# Patient Record
Sex: MALE | Race: WHITE
[De-identification: names, ages, dates, MRNs, and addresses within clinical notes are randomized per-mention and may not be internally consistent; named-entity substitution may affect disease eponyms.]

---

## 2018-06-19 ENCOUNTER — HOSPITAL ENCOUNTER (OUTPATIENT)
Age: 78
End: 2018-06-19
Payer: MEDICARE

## 2018-06-19 DIAGNOSIS — I71.4: ICD-10-CM

## 2018-06-19 DIAGNOSIS — I10: ICD-10-CM

## 2018-06-19 DIAGNOSIS — Z12.5: Primary | ICD-10-CM

## 2018-06-19 LAB
ALBUMIN SERPL-MCNC: 3.8 G/DL (ref 3.5–5)
ALBUMIN/GLOB SERPL: 1.4 {RATIO} (ref 1–2.8)
ALP SERPL-CCNC: 70 U/L (ref 38–126)
ALT SERPL-CCNC: 47 IU/L (ref 21–72)
BUN SERPL-MCNC: 35 MG/DL (ref 9–20)
CALCIUM SERPL-MCNC: 9.4 MG/DL (ref 8.4–10.2)
CHLORIDE SERPL-SCNC: 107 MMOL/L (ref 98–107)
CO2 SERPL-SCNC: 28 MMOL/L (ref 22–32)
ESTIMATED GLOMERULAR FILT RATE: 48.9 ML/MIN (ref 60–?)
GLOBULIN SER CALC-MCNC: 2.8 G/DL (ref 1.7–4.1)
GLUCOSE SERPL-MCNC: 132 MG/DL (ref 80–110)
HEMOLYSIS: < 15 (ref 0–50)
POTASSIUM SERPL-SCNC: 4.1 MMOL/L (ref 3.4–5.1)
PROT SERPL-MCNC: 6.6 G/DL (ref 6.3–8.2)
SODIUM SERPL-SCNC: 144 MMOL/L (ref 137–145)

## 2018-06-19 PROCEDURE — 80053 COMPREHEN METABOLIC PANEL: CPT

## 2018-06-19 PROCEDURE — 36415 COLL VENOUS BLD VENIPUNCTURE: CPT

## 2018-11-26 ENCOUNTER — HOSPITAL ENCOUNTER (OUTPATIENT)
Age: 78
End: 2018-11-26
Payer: MEDICARE

## 2018-11-26 DIAGNOSIS — R97.20: ICD-10-CM

## 2018-11-26 DIAGNOSIS — I10: Primary | ICD-10-CM

## 2018-11-26 LAB
ALBUMIN SERPL-MCNC: 3.6 G/DL (ref 3.5–5)
ALBUMIN/GLOB SERPL: 1.6 {RATIO} (ref 1–2.8)
ALP SERPL-CCNC: 65 U/L (ref 38–126)
ALT SERPL-CCNC: 34 IU/L (ref 21–72)
BUN SERPL-MCNC: 32 MG/DL (ref 9–20)
CALCIUM SERPL-MCNC: 9 MG/DL (ref 8.4–10.2)
CHLORIDE SERPL-SCNC: 107 MMOL/L (ref 98–107)
CO2 SERPL-SCNC: 28 MMOL/L (ref 22–32)
ESTIMATED GLOMERULAR FILT RATE: 48.9 ML/MIN (ref 60–?)
GLOBULIN SER CALC-MCNC: 2.3 G/DL (ref 1.7–4.1)
GLUCOSE SERPL-MCNC: 121 MG/DL (ref 80–110)
HEMOLYSIS: < 15 (ref 0–50)
POTASSIUM SERPL-SCNC: 4.4 MMOL/L (ref 3.4–5.1)
PROT SERPL-MCNC: 5.9 G/DL (ref 6.3–8.2)
PSA SERPL-MCNC: 4.14 NG/ML (ref 0.1–4)
SODIUM SERPL-SCNC: 146 MMOL/L (ref 137–145)

## 2018-11-26 PROCEDURE — 80053 COMPREHEN METABOLIC PANEL: CPT

## 2018-11-26 PROCEDURE — 84153 ASSAY OF PSA TOTAL: CPT

## 2018-11-26 PROCEDURE — 36415 COLL VENOUS BLD VENIPUNCTURE: CPT

## 2018-12-13 ENCOUNTER — HOSPITAL ENCOUNTER (OUTPATIENT)
Age: 78
End: 2018-12-13
Payer: MEDICARE

## 2018-12-13 DIAGNOSIS — I71.4: Primary | ICD-10-CM

## 2018-12-13 PROCEDURE — 76775 US EXAM ABDO BACK WALL LIM: CPT

## 2018-12-13 NOTE — DI.US.S_ITS
PROCEDURE:  US RETRO PERITONEAL LIMITED  
   
INDICATIONS:  AAA  
   
TECHNIQUE:  Real time scanning was performed of the aorta and iliac arteries, with image   
documentation.    
   
COMPARISON:  [facility], US, RENAL OR RETROPERITONEAL LIMIT, 11/13/2017, 9:44.  
   
FINDINGS:    
Aorta:  Proximal aortic obscured by overlying bowel gas.  Mid-aorta measures 2.4 cm.    
Distal aortic diameter is 5.1 cm.    
   
Iliac arteries:  Right common iliac artery measures 1.5 cm.  Left common iliac artery   
measures 1.4cm.    
   
IMPRESSION: Increase in size of infrarenal distal abdominal aortic aneurysm measuring up   
to 5.1 cm in maximal AP diameter.  Recommend 3-6 month followup ultrasound and consider   
surgery/endovascular treatment.  
   
   
   
Dictated by: Anthony OSUNA Interpreted: Blue Dallas MD on 12/13/2018 at 8:22       
Approved by: Blue Dallas M.D. on 12/13/2018 at 11:50

## 2019-02-08 ENCOUNTER — HOSPITAL ENCOUNTER (OUTPATIENT)
Age: 79
End: 2019-02-08
Payer: MEDICARE

## 2019-02-08 DIAGNOSIS — E87.6: Primary | ICD-10-CM

## 2019-02-08 LAB
BUN SERPL-MCNC: 32 MG/DL (ref 9–20)
CALCIUM SERPL-MCNC: 8.9 MG/DL (ref 8.4–10.2)
CHLORIDE SERPL-SCNC: 103 MMOL/L (ref 98–107)
CO2 SERPL-SCNC: 29 MMOL/L (ref 22–32)
ESTIMATED GLOMERULAR FILT RATE: 48.9 ML/MIN (ref 60–?)
GLUCOSE SERPL-MCNC: 128 MG/DL (ref 80–110)
HEMOLYSIS: < 15 (ref 0–50)
POTASSIUM SERPL-SCNC: 4.5 MMOL/L (ref 3.4–5.1)
SODIUM SERPL-SCNC: 140 MMOL/L (ref 137–145)

## 2019-02-08 PROCEDURE — 36415 COLL VENOUS BLD VENIPUNCTURE: CPT

## 2019-02-08 PROCEDURE — 80048 BASIC METABOLIC PNL TOTAL CA: CPT

## 2019-02-20 ENCOUNTER — HOSPITAL ENCOUNTER (OUTPATIENT)
Age: 79
End: 2019-02-20
Payer: MEDICARE

## 2019-02-20 DIAGNOSIS — N32.3: ICD-10-CM

## 2019-02-20 DIAGNOSIS — N28.89: Primary | ICD-10-CM

## 2019-02-20 DIAGNOSIS — N40.0: ICD-10-CM

## 2019-02-20 DIAGNOSIS — N32.9: ICD-10-CM

## 2019-02-20 PROCEDURE — 76770 US EXAM ABDO BACK WALL COMP: CPT

## 2019-02-20 NOTE — DI.US.S_ITS
PROCEDURE:  US RENAL COMPLETE  
   
INDICATIONS:  RENAL MASS ON OUTSIDE CT  
   
TECHNIQUE:    
Real-time scanning was performed of the kidneys and bladder, with image documentation.    
   
COMPARISON:  Outside Facility, , CT ANGIOGRAPHY ABDOMEN AND PELVIS, 1/15/2019, 10:45.    
[facility], CT, ABDOMEN/PELVIS WITH CONTRAST, 10/03/2012, 13:35.  formerly [facility], US, RETROPERITONEAL W/DOP LTD(PNL), 10/29/2012, 10:12.  [facility], US,   
RENAL OR RETROPERITONEAL LIMIT, 11/13/2017, 9:44.  [facility], US, RENAL OR   
RETROPERITONEAL LIMIT, 5/11/2017, 9:20.  [facility], US, RENAL OR RETROPERITONEAL   
LIMIT, 11/03/2016, 8:37.  [facility], US RETRO PERITONEAL LIMITED, 12/13/2018,   
7:25.  
   
FINDINGS:    
   
Kidneys:  Kidneys are normal in size.  Right kidney measures 11.0 cm long; left kidney   
measures 11.0 cm long.  Right renal cortical thickness is 0.6 cm; left renal cortical   
thickness is 0.7 cm.  Renal cortical echotexture is normal.  No hydronephrosis or   
nephrolithiasis.  There is a 1.4 cm exophytic, simple appearing cyst in the inferior pole   
the right kidney, correlating with the mass seen on the outside CT.  A 1.5 cm simple cyst   
is noted in the mid to superior pole of the right kidney. In the left kidney, there is a   
1.5 cm cyst in the lateral aspect of the left kidney demonstrating a small focus of mural   
calcification.  
   
Bladder:  Pre-void bladder volume is 533 mL.  Post-void residual is 276 mL.  There is a   
hypoechoic mass in the gravity dependent bladder base measuring 5.8 x 2.8 x 5.3 cm. A   
bladder diverticulum is noted in the right superior aspect of the bladder. Prostate is   
enlarged.  
   
On pelvic ultrasound comparison no vascularity. On pre-void images, both ureteral jets   
are noted with color Doppler interrogation.  (Of note, ureteral jets may not be   
detectable in up to 25% of cases due to insufficient differences in specific gravity   
between ureteral and bladder urine).    
   
Miscellaneous:  No free pelvic fluid.    
   
IMPRESSION:  
   
1. The exophytic mass in the inferior pole of the right kidney is compatible with a   
simple cyst on ultrasound. Other simple appearing cysts are also noted in kidneys.  
   
2. A 5.8 x 2.8 x 5.3 cm nonvascular mass is present within the gravity dependent bladder   
lumen, possibly a clot.  
   
3. 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  
   
4. Enlarged prostate.  
   
5. 276 mL post void residual consistent with urinary retention.  
   
   
Dictated by: Blue Dallas M.D. on 2/20/2019 at 14:30       
Approved by: Blue Dallas M.D. on 2/20/2019 at 14:45

## 2019-06-03 ENCOUNTER — HOSPITAL ENCOUNTER (OUTPATIENT)
Age: 79
End: 2019-06-03
Payer: MEDICARE

## 2019-06-03 DIAGNOSIS — E78.2: Primary | ICD-10-CM

## 2019-06-03 DIAGNOSIS — Z12.5: ICD-10-CM

## 2019-06-03 DIAGNOSIS — I10: ICD-10-CM

## 2019-06-03 LAB
ALBUMIN SERPL-MCNC: 3.6 G/DL (ref 3.5–5)
ALBUMIN/GLOB SERPL: 1.6 {RATIO} (ref 1–2.8)
ALP SERPL-CCNC: 69 U/L (ref 38–126)
ALT SERPL-CCNC: 29 IU/L (ref 21–72)
BUN SERPL-MCNC: 31 MG/DL (ref 9–20)
CALCIUM SERPL-MCNC: 9.2 MG/DL (ref 8.4–10.2)
CHLORIDE SERPL-SCNC: 104 MMOL/L (ref 98–107)
CHOLEST SERPL-MCNC: 156 MG/DL (ref 140–199)
CO2 SERPL-SCNC: 31 MMOL/L (ref 22–32)
ESTIMATED GLOMERULAR FILT RATE: 45 ML/MIN (ref 60–?)
GLOBULIN SER CALC-MCNC: 2.3 G/DL (ref 1.7–4.1)
GLUCOSE SERPL-MCNC: 125 MG/DL (ref 80–110)
HDLC SERPL-MCNC: 37 MG/DL (ref 40–60)
HEMOLYSIS: < 15 (ref 0–50)
POTASSIUM SERPL-SCNC: 4.7 MMOL/L (ref 3.4–5.1)
PROT SERPL-MCNC: 5.9 G/DL (ref 6.3–8.2)
PSA SERPL-MCNC: 4.04 NG/ML (ref 0.1–4)
SODIUM SERPL-SCNC: 142 MMOL/L (ref 137–145)
TRIGL SERPL-MCNC: 112 MG/DL (ref 35–150)

## 2019-06-03 PROCEDURE — 36415 COLL VENOUS BLD VENIPUNCTURE: CPT

## 2019-06-03 PROCEDURE — 80061 LIPID PANEL: CPT

## 2019-06-03 PROCEDURE — 84153 ASSAY OF PSA TOTAL: CPT

## 2019-06-03 PROCEDURE — 80053 COMPREHEN METABOLIC PANEL: CPT

## 2019-09-03 ENCOUNTER — HOSPITAL ENCOUNTER (OUTPATIENT)
Age: 79
End: 2019-09-03
Payer: MEDICARE

## 2019-09-03 DIAGNOSIS — I10: Primary | ICD-10-CM

## 2019-09-03 LAB
BUN SERPL-MCNC: 34 MG/DL (ref 9–20)
CALCIUM SERPL-MCNC: 9.5 MG/DL (ref 8.4–10.2)
CHLORIDE SERPL-SCNC: 106 MMOL/L (ref 98–107)
CO2 SERPL-SCNC: 30 MMOL/L (ref 22–32)
ESTIMATED GLOMERULAR FILT RATE: 39 ML/MIN (ref 60–?)
GLUCOSE SERPL-MCNC: 137 MG/DL (ref 80–110)
HEMOLYSIS: < 15 (ref 0–50)
POTASSIUM SERPL-SCNC: 4.4 MMOL/L (ref 3.4–5.1)
SODIUM SERPL-SCNC: 143 MMOL/L (ref 137–145)

## 2019-09-03 PROCEDURE — 80048 BASIC METABOLIC PNL TOTAL CA: CPT

## 2019-09-03 PROCEDURE — 36415 COLL VENOUS BLD VENIPUNCTURE: CPT

## 2019-11-18 ENCOUNTER — HOSPITAL ENCOUNTER (OUTPATIENT)
Age: 79
End: 2019-11-18
Payer: MEDICARE

## 2019-11-18 DIAGNOSIS — R01.1: ICD-10-CM

## 2019-11-18 DIAGNOSIS — I08.3: Primary | ICD-10-CM

## 2019-11-18 PROCEDURE — 76770 US EXAM ABDO BACK WALL COMP: CPT

## 2019-11-18 PROCEDURE — 93307 TTE W/O DOPPLER COMPLETE: CPT

## 2019-11-18 NOTE — DI.ECHO.S_ITS
"                                    [facility]  
+---------+                        [facility]                        +---------+  
:         :                      [address]                     :         :  
:         :                      [address]                     :         :  
:         :                          [id number]                         :         :  
:         :                       Phone: [phone]                      :         :  
+---------+                        [phone]                        +---------+  
                             Echocardiogram Report  
+-----------------------------------------------------------------------------+  
:Name: DELMER ZARCO                 Study Date: 2019   Height: 72 in  :  
:[facility] MRN #: [id number]                                    Weight: 227 lb :  
:Account #: [id number]                Gender: Male             BSA: 2.2 m2    :  
:: 1940                      Age: 79 yrs              BP: 174/86 mmHg:  
:Reason For Study: MURMUR                                                     :  
:                                     Performed By: Temp Staff                :  
:Referring: CONNOR WALKER                                         :  
+-----------------------------------------------------------------------------+  
Interpretation Summary  
Mild-moderate concentric left ventricular hypertrophy with ejection fraction  
55-60%.  
Severely dilated left atrium.  
   
Mild aortic stenosis.  
Mild mitral regurgitation.  
   
Mild tricuspid regurgitation.  
Mildly dilated aortic root and ascending aorta.  
   
   
Procedure:   A two-dimensional transthoracic echocardiogram with color flow  
and Doppler was performed. The study quality was technically adequate. There  
is no prior echocardiogram noted for this patient. The patient was in normal  
sinus rhythm during the exam.  
Left Ventricle:   The left ventricle is normal in size. There is mild-moderate  
concentric left ventricular hypertrophy. The ejection fraction is estimated to  
be 55-60%. There are no focal wall motion abnormalities.  
Right Ventricle:   The right ventricle is normal in size and function.  
Atria:   The left atrium is severely dilated. Right atrial size is normal. The  
interatrial septum is intact with no evidence for an atrial septal defect.  
Mitral Valve:   The mitral valve leaflets appear mildly thickened, but open  
well. There is mild mitral annular calcification. There is mild mitral  
regurgitation.  
Aortic Valve:   The aortic valve is trileaflet. There is mild aortic stenosis.  
The calculated aortic valve area is 1.9 cm2. The peak aortic velocity is 2.29  
m/sec. The aortic valve mean gradient is 10 mmHg. There is moderate aortic  
regurgitation.  
Tricuspid Valve:   The tricuspid valve is normal in structure and function.  
There is mild tricuspid regurgitation. Pulmonary artery pressures cannot be  
estimated because of the lack of a measurable TR jet velocity.  
Pulmonic Valve:   The pulmonic valve is normal in structure and function.  
There is mild pulmonic regurgitation.  
Great Vessels:   The aortic root is mildly dilated. The ascending aorta is  
mildly enlarged. The pulmonary artery is normal size. The inferior vena cava  
was not visualized.  
Pericardium/ Pleura   There is no pericardial effusion. There is no pleural  
effusion.  
   
   
MMode/2D Measurements & Calculations  
LVIDd: 5.1 cm                                   LVOT diam: 2.2 cm  
LVIDs: 4.1 cm                                   Ao root diam: 3.7 cm  
FS: 20.6 %                                      Aortic Jxn: 2.7 cm  
EPSS: 1.5 cm                                    asc Aorta Diam: 3.9 cm  
IVSd: 1.4 cm  
LVPWd: 1.4 cm  
LV jaimes. diameter/BSA (cm/m^2): 2.3  
LV sys. diameter/BSA (cm/m^2): 1.8  
        _______________________________________________________________  
LA A2 area: 30.7 cm2                            RA long 
[id number]|2019 14:47:00|2019 14:47:00|PRAVEEN|[facility]|Health Information Management|[id number]|" Operative Date/Time/Diagnoses

## 2019-11-18 NOTE — DI.US.S_ITS
PROCEDURE:  US RENAL COMPLETE  
   
INDICATIONS:  CHRONIC KIDNEY DISEASE  
   
TECHNIQUE:    
Real-time scanning was performed of the kidneys and bladder, with image documentation.    
   
COMPARISON:  [facility], , US RETRO PERITONEAL LIMITED, 12/13/2018, 7:25.  Outside   
Facility, , CT ANGIOGRAPHY ABDOMEN AND PELVIS, 1/15/2019, 10:45.  [facility], ,   
US RENAL COMPLETE, 2/20/2019, 11:19.  
   
FINDINGS:    
   
Kidneys:  Kidneys are normal in size.  Right kidney measures 9.6 cm long; left kidney   
measures 10.7 cm long.  Right renal cortical thickness is 1.3 cm; left renal cortical   
thickness is 0.9 cm.  Renal cortical echotexture is normal.  No hydronephrosis or   
nephrolithiasis.  No suspicious solid mass lesions.  There are small renal cortical cysts   
bilaterally. There is a 1.3 cm cyst in anterior cortex of the mid right kidney, and a 1.6   
cm cyst in the anterior cortex upper pole of the right kidney. A 1.5 cm cyst is noted in   
the anterior cortex of the left kidney.  
   
Bladder:  Pre-void bladder volume is 185 mL.  Post-void residual is 65 mL.  Pre-void   
images demonstrate no intraluminal masses or stones.  On pre-void images, right ureteral   
jet is noted with color Doppler interrogation.  Left ureteral jet is not visualized. (Of   
note, ureteral jets may not be detectable in up to 25% of cases due to insufficient   
differences in specific gravity between ureteral and bladder urine).    
   
Miscellaneous:  No free pelvic fluid.    
   
IMPRESSION:  
   
1. Bilateral renal cortical thinning and small renal cortical cysts. No hydronephrosis.  
   
2. Moderate postvoid residual in bladder measuring 65 mL.  
   
   
   
   
Dictated by: Blue Dallas M.D. on 11/18/2019 at 11:31       
Approved by: Blue Dallas M.D. on 11/18/2019 at 11:44

## 2020-01-16 ENCOUNTER — HOSPITAL ENCOUNTER (OUTPATIENT)
Age: 80
End: 2020-01-16
Payer: MEDICARE

## 2020-01-16 DIAGNOSIS — N18.3: ICD-10-CM

## 2020-01-16 DIAGNOSIS — I10: Primary | ICD-10-CM

## 2020-01-16 LAB
ALBUMIN SERPL-MCNC: 3.7 G/DL (ref 3.5–5)
ALBUMIN/GLOB SERPL: 1.5 {RATIO} (ref 1–2.8)
ALP SERPL-CCNC: 70 U/L (ref 38–126)
ALT SERPL-CCNC: 22 IU/L (ref ?–50)
BUN SERPL-MCNC: 31 MG/DL (ref 9–20)
CALCIUM SERPL-MCNC: 9.8 MG/DL (ref 8.4–10.2)
CHLORIDE SERPL-SCNC: 106 MMOL/L (ref 98–107)
CO2 SERPL-SCNC: 28 MMOL/L (ref 22–32)
ESTIMATED GLOMERULAR FILT RATE: 39 ML/MIN (ref 60–?)
GLOBULIN SER CALC-MCNC: 2.5 G/DL (ref 1.7–4.1)
GLUCOSE SERPL-MCNC: 122 MG/DL (ref 80–110)
HEMOLYSIS: < 15 (ref 0–50)
POTASSIUM SERPL-SCNC: 4.1 MMOL/L (ref 3.4–5.1)
PROT SERPL-MCNC: 6.2 G/DL (ref 6.3–8.2)
SODIUM SERPL-SCNC: 141 MMOL/L (ref 137–145)

## 2020-01-16 PROCEDURE — 80053 COMPREHEN METABOLIC PANEL: CPT

## 2020-01-16 PROCEDURE — 36415 COLL VENOUS BLD VENIPUNCTURE: CPT

## 2020-01-18 ENCOUNTER — HOSPITAL ENCOUNTER (OUTPATIENT)
Age: 80
End: 2020-01-18
Payer: MEDICARE

## 2020-01-18 DIAGNOSIS — N18.3: Primary | ICD-10-CM

## 2020-01-18 LAB
APPEARANCE UR: CLEAR
BILIRUBIN URINE UA: NEGATIVE
COLOR UR: YELLOW
GLUCOSE URINE UA: NEGATIVE G/DL
HGB UR QL: NEGATIVE
KETONES URINE UA: NEGATIVE
LEUKOCYTE ESTERASE URINE UA: NEGATIVE
MICROALBUMI CREATININ RATIO UR: 624 UG/MG CR (ref ?–30)
NITRITE URINE UA: NEGATIVE
PH UR: 6.5 [PH] (ref 4.5–8)
PROTEIN URINE UA: (no result)
SP GR UR: 1.01 (ref 1–1.03)
URINE COMMENTS: (no result)
UROBILINOGEN UR QL: 0.2 E.U./DL

## 2020-01-18 PROCEDURE — 81001 URINALYSIS AUTO W/SCOPE: CPT

## 2020-01-18 PROCEDURE — 82043 UR ALBUMIN QUANTITATIVE: CPT

## 2020-01-18 PROCEDURE — 82570 ASSAY OF URINE CREATININE: CPT

## 2020-04-09 ENCOUNTER — HOSPITAL ENCOUNTER (OUTPATIENT)
Age: 80
End: 2020-04-09
Payer: MEDICARE

## 2020-04-09 DIAGNOSIS — N18.3: ICD-10-CM

## 2020-04-09 DIAGNOSIS — I10: Primary | ICD-10-CM

## 2020-04-09 LAB
ALBUMIN SERPL-MCNC: 3.8 G/DL (ref 3.5–5)
ALBUMIN/GLOB SERPL: 1.4 {RATIO} (ref 1–2.8)
ALP SERPL-CCNC: 69 U/L (ref 38–126)
ALT SERPL-CCNC: 24 IU/L (ref ?–50)
BUN SERPL-MCNC: 40 MG/DL (ref 9–20)
CALCIUM SERPL-MCNC: 9.2 MG/DL (ref 8.4–10.2)
CHLORIDE SERPL-SCNC: 107 MMOL/L (ref 98–107)
CO2 SERPL-SCNC: 28 MMOL/L (ref 22–32)
ESTIMATED GLOMERULAR FILT RATE: 35 ML/MIN (ref 60–?)
GLOBULIN SER CALC-MCNC: 2.8 G/DL (ref 1.7–4.1)
GLUCOSE SERPL-MCNC: 122 MG/DL (ref 80–110)
HEMOLYSIS: < 15 (ref 0–50)
POTASSIUM SERPL-SCNC: 4.1 MMOL/L (ref 3.4–5.1)
PROT SERPL-MCNC: 6.6 G/DL (ref 6.3–8.2)
SODIUM SERPL-SCNC: 142 MMOL/L (ref 137–145)
URATE SERPL-MCNC: 7.7 MG/DL (ref 3.5–8.5)

## 2020-04-09 PROCEDURE — 36415 COLL VENOUS BLD VENIPUNCTURE: CPT

## 2020-04-09 PROCEDURE — 80053 COMPREHEN METABOLIC PANEL: CPT

## 2020-04-09 PROCEDURE — 84550 ASSAY OF BLOOD/URIC ACID: CPT

## 2020-05-18 ENCOUNTER — HOSPITAL ENCOUNTER (OUTPATIENT)
Age: 80
End: 2020-05-18
Payer: MEDICARE

## 2020-05-18 DIAGNOSIS — N18.3: ICD-10-CM

## 2020-05-18 DIAGNOSIS — I10: Primary | ICD-10-CM

## 2020-05-18 LAB
BUN SERPL-MCNC: 40 MG/DL (ref 9–20)
CALCIUM SERPL-MCNC: 9.4 MG/DL (ref 8.4–10.2)
CHLORIDE SERPL-SCNC: 107 MMOL/L (ref 98–107)
CO2 SERPL-SCNC: 29 MMOL/L (ref 22–32)
ESTIMATED GLOMERULAR FILT RATE: 37.6 ML/MIN (ref 60–?)
GLUCOSE SERPL-MCNC: 121 MG/DL (ref 80–110)
HEMOLYSIS: < 15 (ref 0–50)
POTASSIUM SERPL-SCNC: 4.7 MMOL/L (ref 3.4–5.1)
SODIUM SERPL-SCNC: 141 MMOL/L (ref 137–145)

## 2020-05-18 PROCEDURE — 80048 BASIC METABOLIC PNL TOTAL CA: CPT

## 2020-05-18 PROCEDURE — 36415 COLL VENOUS BLD VENIPUNCTURE: CPT

## 2020-06-30 ENCOUNTER — HOSPITAL ENCOUNTER (OUTPATIENT)
Age: 80
End: 2020-06-30
Payer: MEDICARE

## 2020-06-30 DIAGNOSIS — N18.3: Primary | ICD-10-CM

## 2020-06-30 LAB
ADD MANUAL DIFF / SLIDE REVIEW: NO
BUN SERPL-MCNC: 39 MG/DL (ref 9–20)
CALCIUM SERPL-MCNC: 9.4 MG/DL (ref 8.4–10.2)
CHLORIDE SERPL-SCNC: 105 MMOL/L (ref 98–107)
CO2 SERPL-SCNC: 28 MMOL/L (ref 22–32)
ESTIMATED GLOMERULAR FILT RATE: 33 ML/MIN (ref 60–?)
GLUCOSE SERPL-MCNC: 122 MG/DL (ref 80–110)
HEMATOCRIT: 33.6 % (ref 41–53)
HEMOGLOBIN: 11.7 G/DL (ref 13.5–17.5)
HEMOLYSIS: < 15 (ref 0–50)
LYMPHOCYTES # SPEC AUTO: 1100 /UL (ref 1100–4500)
MCV RBC: 94.9 FL (ref 80–100)
MEAN CORPUSCULAR HEMOGLOBIN: 33 PG (ref 26–34)
MEAN CORPUSCULAR HGB CONC: 34.8 % (ref 30–36)
MICROALBUMI CREATININ RATIO UR: 628.2 UG/MG CR (ref ?–30)
PLATELET COUNT: 105 X10^3/UL (ref 150–400)
POTASSIUM SERPL-SCNC: 4.3 MMOL/L (ref 3.4–5.1)
SODIUM SERPL-SCNC: 139 MMOL/L (ref 137–145)

## 2020-06-30 PROCEDURE — 82570 ASSAY OF URINE CREATININE: CPT

## 2020-06-30 PROCEDURE — 85025 COMPLETE CBC W/AUTO DIFF WBC: CPT

## 2020-06-30 PROCEDURE — 36415 COLL VENOUS BLD VENIPUNCTURE: CPT

## 2020-06-30 PROCEDURE — 80048 BASIC METABOLIC PNL TOTAL CA: CPT

## 2020-06-30 PROCEDURE — 82043 UR ALBUMIN QUANTITATIVE: CPT

## 2020-08-07 ENCOUNTER — HOSPITAL ENCOUNTER (OUTPATIENT)
Age: 80
End: 2020-08-07
Payer: MEDICARE

## 2020-08-07 DIAGNOSIS — N18.3: Primary | ICD-10-CM

## 2020-08-07 DIAGNOSIS — E78.5: ICD-10-CM

## 2020-08-07 LAB
APPEARANCE UR: CLEAR
BILIRUBIN URINE UA: NEGATIVE
BUN SERPL-MCNC: 32 MG/DL (ref 9–20)
CALCIUM SERPL-MCNC: 9.3 MG/DL (ref 8.4–10.2)
CHLORIDE SERPL-SCNC: 105 MMOL/L (ref 98–107)
CHOLEST SERPL-MCNC: 153 MG/DL (ref 140–199)
CK SERPL-CCNC: 118 U/L (ref 55–170)
CKMB % RELATIVE INDEX: 2.5 % (ref 1.5–5)
CO2 SERPL-SCNC: 26 MMOL/L (ref 22–32)
COLOR UR: YELLOW
ESTIMATED GLOMERULAR FILT RATE: 34.8 ML/MIN (ref 60–?)
GLUCOSE SERPL-MCNC: 113 MG/DL (ref 80–110)
GLUCOSE URINE UA: NEGATIVE G/DL
HDLC SERPL-MCNC: 41 MG/DL (ref 40–60)
HEMOLYSIS: < 15 (ref 0–50)
HGB UR QL: NEGATIVE
KETONES URINE UA: NEGATIVE
LEUKOCYTE ESTERASE URINE UA: NEGATIVE
NITRITE URINE UA: NEGATIVE
PH UR: 6.5 [PH] (ref 4.5–8)
POTASSIUM SERPL-SCNC: 4 MMOL/L (ref 3.4–5.1)
PROTEIN URINE UA: (no result)
SODIUM SERPL-SCNC: 138 MMOL/L (ref 137–145)
SP GR UR: 1.01 (ref 1–1.03)
TRIGL SERPL-MCNC: 117 MG/DL (ref 35–150)
UROBILINOGEN UR QL: 0.2 E.U./DL

## 2020-08-07 PROCEDURE — 81001 URINALYSIS AUTO W/SCOPE: CPT

## 2020-08-07 PROCEDURE — 82550 ASSAY OF CK (CPK): CPT

## 2020-08-07 PROCEDURE — 80048 BASIC METABOLIC PNL TOTAL CA: CPT

## 2020-08-07 PROCEDURE — 82553 CREATINE MB FRACTION: CPT

## 2020-08-07 PROCEDURE — 80061 LIPID PANEL: CPT

## 2020-08-07 PROCEDURE — 36415 COLL VENOUS BLD VENIPUNCTURE: CPT

## 2020-10-27 ENCOUNTER — HOSPITAL ENCOUNTER (OUTPATIENT)
Age: 80
End: 2020-10-27
Payer: MEDICARE

## 2020-10-27 DIAGNOSIS — I10: ICD-10-CM

## 2020-10-27 DIAGNOSIS — N18.30: Primary | ICD-10-CM

## 2020-10-27 LAB
25(OH)D3+25(OH)D2 SERPL-MCNC: 46 NG/ML (ref 30–100)
ADD MANUAL DIFF / SLIDE REVIEW: NO
ALBUMIN SERPL-MCNC: 3.7 G/DL (ref 3.5–5)
ALBUMIN/GLOB SERPL: 1.4 {RATIO} (ref 1–2.8)
ALP SERPL-CCNC: 66 U/L (ref 38–126)
ALT SERPL-CCNC: 20 IU/L (ref ?–50)
APPEARANCE UR: CLEAR
BILIRUBIN URINE UA: NEGATIVE
BUN SERPL-MCNC: 43 MG/DL (ref 9–20)
CALCIUM SERPL-MCNC: 9 MG/DL (ref 8.4–10.2)
CHLORIDE SERPL-SCNC: 105 MMOL/L (ref 98–107)
CO2 SERPL-SCNC: 30 MMOL/L (ref 22–32)
COLOR UR: YELLOW
ESTIMATED GLOMERULAR FILT RATE: 30.8 ML/MIN (ref 60–?)
GLOBULIN SER CALC-MCNC: 2.6 G/DL (ref 1.7–4.1)
GLUCOSE SERPL-MCNC: 112 MG/DL (ref 80–110)
GLUCOSE URINE UA: NEGATIVE G/DL
HEMATOCRIT: 30.8 % (ref 41–53)
HEMOGLOBIN: 10.4 G/DL (ref 13.5–17.5)
HEMOLYSIS: < 15 (ref 0–50)
HGB UR QL: NEGATIVE
KETONES URINE UA: NEGATIVE
LEUKOCYTE ESTERASE URINE UA: NEGATIVE
LYMPHOCYTES # SPEC AUTO: 800 /UL (ref 1100–4500)
MCV RBC: 96 FL (ref 80–100)
MEAN CORPUSCULAR HEMOGLOBIN: 32.4 PG (ref 26–34)
MEAN CORPUSCULAR HGB CONC: 33.7 % (ref 30–36)
MICROALBUMI CREATININ RATIO UR: 815.2 UG/MG CR (ref ?–30)
NITRITE URINE UA: NEGATIVE
PH UR: 6 [PH] (ref 4.5–8)
PHOSPHATE SERPL-MCNC: 4.4 MG/DL (ref 2.3–3.7)
PLATELET COUNT: 105 X10^3/UL (ref 150–400)
POTASSIUM SERPL-SCNC: 4.2 MMOL/L (ref 3.4–5.1)
PROT SERPL-MCNC: 6.3 G/DL (ref 6.3–8.2)
PROTEIN URINE UA: (no result)
SODIUM SERPL-SCNC: 141 MMOL/L (ref 137–145)
SP GR UR: 1.01 (ref 1–1.03)
UROBILINOGEN UR QL: 0.2 E.U./DL

## 2020-10-27 PROCEDURE — 82306 VITAMIN D 25 HYDROXY: CPT

## 2020-10-27 PROCEDURE — 84100 ASSAY OF PHOSPHORUS: CPT

## 2020-10-27 PROCEDURE — 80053 COMPREHEN METABOLIC PANEL: CPT

## 2020-10-27 PROCEDURE — 81001 URINALYSIS AUTO W/SCOPE: CPT

## 2020-10-27 PROCEDURE — 83970 ASSAY OF PARATHORMONE: CPT

## 2020-10-27 PROCEDURE — 85025 COMPLETE CBC W/AUTO DIFF WBC: CPT

## 2020-10-27 PROCEDURE — 82043 UR ALBUMIN QUANTITATIVE: CPT

## 2020-10-27 PROCEDURE — 82570 ASSAY OF URINE CREATININE: CPT

## 2020-10-27 PROCEDURE — 36415 COLL VENOUS BLD VENIPUNCTURE: CPT

## 2020-11-06 ENCOUNTER — HOSPITAL ENCOUNTER (OUTPATIENT)
Age: 80
End: 2020-11-06
Payer: MEDICARE

## 2020-11-06 DIAGNOSIS — N40.0: ICD-10-CM

## 2020-11-06 DIAGNOSIS — N28.1: ICD-10-CM

## 2020-11-06 DIAGNOSIS — N32.3: ICD-10-CM

## 2020-11-06 DIAGNOSIS — N18.32: Primary | ICD-10-CM

## 2020-11-06 PROCEDURE — 76770 US EXAM ABDO BACK WALL COMP: CPT

## 2020-11-06 NOTE — DI.US.S_ITS
PROCEDURE:  US RENAL COMPLETE  
   
INDICATIONS:  Chronic kidney disease, stage 3b  
   
TECHNIQUE:    
Real-time scanning was performed of the kidneys and bladder, with image documentation.    
   
COMPARISON:  Outside Facility, , CT ANGIOGRAPHY ABDOMEN AND PELVIS, 1/15/2019, 10:45.    
[facility], , US RENAL COMPLETE, 11/18/2019, 8:19.  
   
FINDINGS:    
   
Kidneys:  Kidneys are normal in size.  Right kidney measures 13.6 cm long; left kidney   
measures 11.1 cm long.  Right renal cortical thickness is 1.3 cm; left renal cortical   
thickness is 1.0 cm.  Renal cortical echotexture is normal.  No hydronephrosis or   
nephrolithiasis.  No suspicious solid mass lesions.  There are multiple right renal cysts   
measuring up to 1.5 cm in the upper pole with internal low level echoes, and 2.2 cm in   
the lower pole with anechoic appearance.  There also multiple left renal cysts measuring   
up to 1.4 cm in the interpolar region with low level internal echoes.  Additional 1.3 cm   
cyst with low level internal echoes is seen in the inferior pole  
   
Bladder:  Pre-void bladder volume is 508 mL.  Post-void residual is 95 mL.  Pre-void   
images demonstrate no intraluminal masses or stones.  Superior bladder diverticulum   
measuring 4.4 x 2.3 x 2.4 cm.  On pre-void images, both of the ureteral jets are noted   
with color Doppler interrogation.  (Of note, ureteral jets may not be detectable in up to   
25% of cases due to insufficient differences in specific gravity between ureteral and   
bladder urine).  Prostate is enlarged measuring 6.0 x 5.3 x 4.0 cm  
   
Miscellaneous:  No free pelvic fluid.    
   
IMPRESSION:    
   
No hydronephrosis  
   
Multiple bilateral renal cysts, some of which demonstrate mildly complicated appearance,   
although unchanged.    
   
95 mL postvoid residual  
   
Enlarged prostate  
   
Superior bladder diverticulum.  
   
   
   
   
   
Dictated by: Jimmy Caro M.D. on 11/06/2020 at 11:48       
Approved by: Jimmy Caro M.D. on 11/06/2020 at 12:06

## 2020-12-11 ENCOUNTER — HOSPITAL ENCOUNTER (OUTPATIENT)
Age: 80
End: 2020-12-11
Payer: MEDICARE

## 2020-12-11 DIAGNOSIS — N18.32: ICD-10-CM

## 2020-12-11 DIAGNOSIS — N40.0: Primary | ICD-10-CM

## 2020-12-11 LAB
ALBUMIN SERPL-MCNC: 3.5 G/DL (ref 3.5–5)
BUN SERPL-MCNC: 43 MG/DL (ref 9–20)
CALCIUM SERPL-MCNC: 9.1 MG/DL (ref 8.4–10.2)
CHLORIDE SERPL-SCNC: 103 MMOL/L (ref 98–107)
CO2 SERPL-SCNC: 32 MMOL/L (ref 22–32)
ESTIMATED GLOMERULAR FILT RATE: 29.6 ML/MIN (ref 60–?)
GLUCOSE SERPL-MCNC: 121 MG/DL (ref 80–110)
HEMOLYSIS: < 15 (ref 0–50)
PHOSPHATE SERPL-MCNC: 4.8 MG/DL (ref 2.3–3.7)
POTASSIUM SERPL-SCNC: 4.6 MMOL/L (ref 3.4–5.1)
SODIUM SERPL-SCNC: 136 MMOL/L (ref 137–145)

## 2020-12-11 PROCEDURE — 84153 ASSAY OF PSA TOTAL: CPT

## 2020-12-11 PROCEDURE — 84154 ASSAY OF PSA FREE: CPT

## 2020-12-11 PROCEDURE — 80069 RENAL FUNCTION PANEL: CPT

## 2020-12-11 PROCEDURE — 36415 COLL VENOUS BLD VENIPUNCTURE: CPT

## 2020-12-12 LAB — PSA SERPL-MCNC: 4.6 NG/ML (ref 0–4)

## 2021-02-12 ENCOUNTER — HOSPITAL ENCOUNTER (OUTPATIENT)
Age: 81
End: 2021-02-12
Payer: MEDICARE

## 2021-02-12 DIAGNOSIS — N18.32: Primary | ICD-10-CM

## 2021-02-12 LAB
BUN SERPL-MCNC: 36 MG/DL (ref 9–20)
CALCIUM SERPL-MCNC: 8.7 MG/DL (ref 8.4–10.2)
CHLORIDE SERPL-SCNC: 108 MMOL/L (ref 98–107)
CO2 SERPL-SCNC: 31 MMOL/L (ref 22–32)
ESTIMATED GLOMERULAR FILT RATE: 31.5 ML/MIN (ref 60–?)
GLUCOSE SERPL-MCNC: 116 MG/DL (ref 80–110)
HEMOLYSIS: < 15 (ref 0–50)
POTASSIUM SERPL-SCNC: 4.6 MMOL/L (ref 3.4–5.1)
SODIUM SERPL-SCNC: 139 MMOL/L (ref 137–145)

## 2021-02-12 PROCEDURE — 36415 COLL VENOUS BLD VENIPUNCTURE: CPT

## 2021-02-12 PROCEDURE — 80048 BASIC METABOLIC PNL TOTAL CA: CPT

## 2021-03-10 ENCOUNTER — HOSPITAL ENCOUNTER (OUTPATIENT)
Age: 81
End: 2021-03-10
Payer: MEDICARE

## 2021-03-10 VITALS
DIASTOLIC BLOOD PRESSURE: 72 MMHG | RESPIRATION RATE: 18 BRPM | SYSTOLIC BLOOD PRESSURE: 151 MMHG | TEMPERATURE: 97.7 F | HEART RATE: 58 BPM

## 2021-03-10 DIAGNOSIS — D46.9: Primary | ICD-10-CM

## 2021-03-10 DIAGNOSIS — D63.1: ICD-10-CM

## 2021-03-10 DIAGNOSIS — D61.818: ICD-10-CM

## 2021-03-10 DIAGNOSIS — I12.9: ICD-10-CM

## 2021-03-10 DIAGNOSIS — N18.9: ICD-10-CM

## 2021-03-10 PROCEDURE — 96372 THER/PROPH/DIAG INJ SC/IM: CPT

## 2021-03-24 ENCOUNTER — HOSPITAL ENCOUNTER (OUTPATIENT)
Age: 81
End: 2021-03-24
Payer: MEDICARE

## 2021-03-24 VITALS — RESPIRATION RATE: 18 BRPM | SYSTOLIC BLOOD PRESSURE: 137 MMHG | HEART RATE: 65 BPM | DIASTOLIC BLOOD PRESSURE: 71 MMHG

## 2021-03-24 DIAGNOSIS — N18.9: ICD-10-CM

## 2021-03-24 DIAGNOSIS — D63.1: ICD-10-CM

## 2021-03-24 DIAGNOSIS — D46.9: ICD-10-CM

## 2021-03-24 DIAGNOSIS — I12.9: Primary | ICD-10-CM

## 2021-03-24 PROCEDURE — 96372 THER/PROPH/DIAG INJ SC/IM: CPT

## 2021-04-07 ENCOUNTER — HOSPITAL ENCOUNTER (OUTPATIENT)
Age: 81
End: 2021-04-07
Payer: MEDICARE

## 2021-04-07 VITALS — HEART RATE: 59 BPM | RESPIRATION RATE: 16 BRPM | TEMPERATURE: 97.88 F | OXYGEN SATURATION: 98 %

## 2021-04-07 VITALS — SYSTOLIC BLOOD PRESSURE: 151 MMHG | DIASTOLIC BLOOD PRESSURE: 79 MMHG

## 2021-04-07 DIAGNOSIS — N18.9: ICD-10-CM

## 2021-04-07 DIAGNOSIS — I12.9: Primary | ICD-10-CM

## 2021-04-07 DIAGNOSIS — D46.9: ICD-10-CM

## 2021-04-07 DIAGNOSIS — D63.1: ICD-10-CM

## 2021-04-07 PROCEDURE — 96372 THER/PROPH/DIAG INJ SC/IM: CPT

## 2021-04-21 ENCOUNTER — HOSPITAL ENCOUNTER (OUTPATIENT)
Age: 81
End: 2021-04-21
Payer: MEDICARE

## 2021-04-21 VITALS
DIASTOLIC BLOOD PRESSURE: 74 MMHG | HEART RATE: 62 BPM | OXYGEN SATURATION: 98 % | RESPIRATION RATE: 18 BRPM | SYSTOLIC BLOOD PRESSURE: 151 MMHG | TEMPERATURE: 98.78 F

## 2021-04-21 VITALS — HEART RATE: 60 BPM | SYSTOLIC BLOOD PRESSURE: 139 MMHG | DIASTOLIC BLOOD PRESSURE: 72 MMHG

## 2021-04-21 DIAGNOSIS — I12.9: ICD-10-CM

## 2021-04-21 DIAGNOSIS — D63.1: ICD-10-CM

## 2021-04-21 DIAGNOSIS — N18.9: ICD-10-CM

## 2021-04-21 DIAGNOSIS — D46.9: Primary | ICD-10-CM

## 2021-04-28 ENCOUNTER — HOSPITAL ENCOUNTER (OUTPATIENT)
Age: 81
End: 2021-04-28
Payer: MEDICARE

## 2021-04-28 DIAGNOSIS — I12.9: ICD-10-CM

## 2021-04-28 DIAGNOSIS — N18.9: ICD-10-CM

## 2021-04-28 DIAGNOSIS — D63.1: ICD-10-CM

## 2021-04-28 DIAGNOSIS — D46.9: Primary | ICD-10-CM

## 2021-04-28 PROCEDURE — 99215 OFFICE O/P EST HI 40 MIN: CPT

## 2021-04-28 PROCEDURE — 36415 COLL VENOUS BLD VENIPUNCTURE: CPT

## 2021-04-28 PROCEDURE — 96372 THER/PROPH/DIAG INJ SC/IM: CPT

## 2021-04-28 PROCEDURE — 85018 HEMOGLOBIN: CPT

## 2021-04-28 PROCEDURE — 85014 HEMATOCRIT: CPT

## 2021-05-12 ENCOUNTER — HOSPITAL ENCOUNTER (OUTPATIENT)
Age: 81
End: 2021-05-12
Payer: MEDICARE

## 2021-05-12 VITALS
OXYGEN SATURATION: 100 % | HEART RATE: 59 BPM | DIASTOLIC BLOOD PRESSURE: 85 MMHG | RESPIRATION RATE: 15 BRPM | SYSTOLIC BLOOD PRESSURE: 163 MMHG

## 2021-05-12 VITALS — HEART RATE: 60 BPM | OXYGEN SATURATION: 99 % | TEMPERATURE: 97.6 F | RESPIRATION RATE: 17 BRPM

## 2021-05-12 VITALS — SYSTOLIC BLOOD PRESSURE: 161 MMHG | DIASTOLIC BLOOD PRESSURE: 92 MMHG

## 2021-05-12 DIAGNOSIS — D61.818: ICD-10-CM

## 2021-05-12 DIAGNOSIS — I12.9: ICD-10-CM

## 2021-05-12 DIAGNOSIS — D46.9: Primary | ICD-10-CM

## 2021-05-12 DIAGNOSIS — N18.9: ICD-10-CM

## 2021-05-12 DIAGNOSIS — D63.1: ICD-10-CM

## 2021-05-12 PROCEDURE — 96372 THER/PROPH/DIAG INJ SC/IM: CPT

## 2021-05-26 ENCOUNTER — HOSPITAL ENCOUNTER (OUTPATIENT)
Age: 81
End: 2021-05-26
Payer: MEDICARE

## 2021-05-26 VITALS
DIASTOLIC BLOOD PRESSURE: 72 MMHG | SYSTOLIC BLOOD PRESSURE: 137 MMHG | HEART RATE: 64 BPM | RESPIRATION RATE: 16 BRPM | OXYGEN SATURATION: 99 % | TEMPERATURE: 97.8 F

## 2021-05-26 DIAGNOSIS — D63.1: ICD-10-CM

## 2021-05-26 DIAGNOSIS — D61.818: ICD-10-CM

## 2021-05-26 DIAGNOSIS — I12.9: ICD-10-CM

## 2021-05-26 DIAGNOSIS — N18.9: ICD-10-CM

## 2021-05-26 DIAGNOSIS — D46.9: Primary | ICD-10-CM

## 2021-05-26 PROCEDURE — 96372 THER/PROPH/DIAG INJ SC/IM: CPT

## 2021-06-09 ENCOUNTER — HOSPITAL ENCOUNTER (OUTPATIENT)
Age: 81
End: 2021-06-09
Payer: MEDICARE

## 2021-06-09 VITALS
OXYGEN SATURATION: 99 % | RESPIRATION RATE: 15 BRPM | SYSTOLIC BLOOD PRESSURE: 154 MMHG | HEART RATE: 65 BPM | DIASTOLIC BLOOD PRESSURE: 75 MMHG | TEMPERATURE: 98.2 F

## 2021-06-09 DIAGNOSIS — D46.9: ICD-10-CM

## 2021-06-09 DIAGNOSIS — N18.9: ICD-10-CM

## 2021-06-09 DIAGNOSIS — D63.1: ICD-10-CM

## 2021-06-09 DIAGNOSIS — I12.9: Primary | ICD-10-CM

## 2021-06-09 PROCEDURE — 96372 THER/PROPH/DIAG INJ SC/IM: CPT

## 2021-06-23 ENCOUNTER — HOSPITAL ENCOUNTER (OUTPATIENT)
Age: 81
End: 2021-06-23
Payer: MEDICARE

## 2021-06-23 VITALS
RESPIRATION RATE: 16 BRPM | HEART RATE: 64 BPM | DIASTOLIC BLOOD PRESSURE: 76 MMHG | SYSTOLIC BLOOD PRESSURE: 144 MMHG | TEMPERATURE: 97.1 F | OXYGEN SATURATION: 97 %

## 2021-06-23 DIAGNOSIS — N18.9: ICD-10-CM

## 2021-06-23 DIAGNOSIS — Z85.828: ICD-10-CM

## 2021-06-23 DIAGNOSIS — E78.5: ICD-10-CM

## 2021-06-23 DIAGNOSIS — I12.9: ICD-10-CM

## 2021-06-23 DIAGNOSIS — Z87.891: ICD-10-CM

## 2021-06-23 DIAGNOSIS — D46.9: Primary | ICD-10-CM

## 2021-06-23 DIAGNOSIS — D63.1: ICD-10-CM

## 2021-06-23 DIAGNOSIS — I71.4: ICD-10-CM

## 2021-06-23 DIAGNOSIS — Z85.820: ICD-10-CM

## 2021-06-23 PROCEDURE — 99215 OFFICE O/P EST HI 40 MIN: CPT

## 2021-06-23 PROCEDURE — 96372 THER/PROPH/DIAG INJ SC/IM: CPT

## 2021-06-29 ENCOUNTER — HOSPITAL ENCOUNTER (OUTPATIENT)
Age: 81
End: 2021-06-29
Payer: MEDICARE

## 2021-06-29 DIAGNOSIS — N18.32: Primary | ICD-10-CM

## 2021-06-29 LAB
25(OH)D3+25(OH)D2 SERPL-MCNC: 41 NG/ML (ref 30–100)
ADD MANUAL DIFF / SLIDE REVIEW: YES
APPEARANCE UR: CLEAR
BAND NEUTROPHILS PERCENT: 1 % (ref 3–7)
BASOPHILS NFR SPEC MANUAL: 2 % (ref 0–1)
BILIRUBIN URINE UA: NEGATIVE
BUN SERPL-MCNC: 36 MG/DL (ref 9–20)
CALCIUM SERPL-MCNC: 9.1 MG/DL (ref 8.4–10.2)
CHLORIDE SERPL-SCNC: 107 MMOL/L (ref 98–107)
CO2 SERPL-SCNC: 24 MMOL/L (ref 22–32)
COLOR UR: YELLOW
EOSINOPHILS PERCENT MANUAL: 3 % (ref 2–4)
ESTIMATED GLOMERULAR FILT RATE: 25.4 ML/MIN (ref 60–?)
GLUCOSE SERPL-MCNC: 100 MG/DL (ref 80–110)
GLUCOSE URINE UA: NEGATIVE G/DL
HEMATOCRIT: 29.9 % (ref 41–53)
HEMOGLOBIN: 10 G/DL (ref 13.5–17.5)
HEMOLYSIS: < 15 (ref 0–50)
HGB UR QL: (no result)
KETONES URINE UA: NEGATIVE
LEUKOCYTE ESTERASE URINE UA: NEGATIVE
LYMPHOCYTES PERCENT MANUAL: 18 % (ref 25–45)
MCV RBC: 93.8 FL (ref 80–100)
MEAN CORPUSCULAR HEMOGLOBIN: 31.2 PG (ref 26–34)
MEAN CORPUSCULAR HGB CONC: 33.3 % (ref 30–36)
MICROALBUMI CREATININ RATIO UR: 2045.9 UG/MG CR (ref ?–30)
MONOCYTES PERCENT MANUAL: 10 % (ref 2–11)
NEUTROPHILS ABSOLUTE MANUAL: 1876 /UL (ref 3000–5900)
NEUTS SEG NFR BLD: 66 % (ref 38–70)
NITRITE URINE UA: NEGATIVE
PH UR: 6.5 [PH] (ref 4.5–8)
PLATELET COUNT: 107 X10^3/UL (ref 150–400)
POTASSIUM SERPL-SCNC: 4.4 MMOL/L (ref 3.4–5.1)
PROTEIN URINE UA: (no result)
SODIUM SERPL-SCNC: 137 MMOL/L (ref 137–145)
SP GR UR: 1.01 (ref 1–1.03)
TOTAL CELLS COUNTED: 100
UROBILINOGEN UR QL: 0.2 E.U./DL

## 2021-06-29 PROCEDURE — 85007 BL SMEAR W/DIFF WBC COUNT: CPT

## 2021-06-29 PROCEDURE — 82043 UR ALBUMIN QUANTITATIVE: CPT

## 2021-06-29 PROCEDURE — 83970 ASSAY OF PARATHORMONE: CPT

## 2021-06-29 PROCEDURE — 85025 COMPLETE CBC W/AUTO DIFF WBC: CPT

## 2021-06-29 PROCEDURE — 82306 VITAMIN D 25 HYDROXY: CPT

## 2021-06-29 PROCEDURE — 82570 ASSAY OF URINE CREATININE: CPT

## 2021-06-29 PROCEDURE — 81001 URINALYSIS AUTO W/SCOPE: CPT

## 2021-06-29 PROCEDURE — 36415 COLL VENOUS BLD VENIPUNCTURE: CPT

## 2021-06-29 PROCEDURE — 80048 BASIC METABOLIC PNL TOTAL CA: CPT

## 2021-07-07 ENCOUNTER — HOSPITAL ENCOUNTER (OUTPATIENT)
Age: 81
End: 2021-07-07
Payer: MEDICARE

## 2021-07-07 VITALS
DIASTOLIC BLOOD PRESSURE: 74 MMHG | RESPIRATION RATE: 18 BRPM | OXYGEN SATURATION: 99 % | HEART RATE: 58 BPM | TEMPERATURE: 97.88 F | SYSTOLIC BLOOD PRESSURE: 148 MMHG

## 2021-07-07 DIAGNOSIS — D63.1: ICD-10-CM

## 2021-07-07 DIAGNOSIS — I12.9: Primary | ICD-10-CM

## 2021-07-07 DIAGNOSIS — D46.9: ICD-10-CM

## 2021-07-07 DIAGNOSIS — N18.9: ICD-10-CM

## 2021-07-07 PROCEDURE — 96372 THER/PROPH/DIAG INJ SC/IM: CPT

## 2021-07-16 ENCOUNTER — HOSPITAL ENCOUNTER (OUTPATIENT)
Age: 81
End: 2021-07-16
Payer: MEDICARE

## 2021-07-16 DIAGNOSIS — N18.4: Primary | ICD-10-CM

## 2021-07-16 LAB
BUN SERPL-MCNC: 51 MG/DL (ref 9–20)
CALCIUM SERPL-MCNC: 9 MG/DL (ref 8.4–10.2)
CHLORIDE SERPL-SCNC: 101 MMOL/L (ref 98–107)
CO2 SERPL-SCNC: 28 MMOL/L (ref 22–32)
ESTIMATED GLOMERULAR FILT RATE: 20.9 ML/MIN (ref 60–?)
GLUCOSE SERPL-MCNC: 99 MG/DL (ref 80–110)
HEMOLYSIS: < 15 (ref 0–50)
POTASSIUM SERPL-SCNC: 5.1 MMOL/L (ref 3.4–5.1)
SODIUM SERPL-SCNC: 136 MMOL/L (ref 137–145)

## 2021-07-16 PROCEDURE — 80048 BASIC METABOLIC PNL TOTAL CA: CPT

## 2021-07-16 PROCEDURE — 36415 COLL VENOUS BLD VENIPUNCTURE: CPT

## 2021-07-22 ENCOUNTER — HOSPITAL ENCOUNTER (OUTPATIENT)
Age: 81
End: 2021-07-22
Payer: MEDICARE

## 2021-07-22 DIAGNOSIS — I12.9: Primary | ICD-10-CM

## 2021-07-22 DIAGNOSIS — N18.9: ICD-10-CM

## 2021-07-22 DIAGNOSIS — D63.1: ICD-10-CM

## 2021-07-22 DIAGNOSIS — D46.9: ICD-10-CM

## 2021-08-05 ENCOUNTER — HOSPITAL ENCOUNTER (OUTPATIENT)
Age: 81
End: 2021-08-05
Payer: MEDICARE

## 2021-08-05 VITALS
TEMPERATURE: 97.88 F | DIASTOLIC BLOOD PRESSURE: 75 MMHG | SYSTOLIC BLOOD PRESSURE: 146 MMHG | OXYGEN SATURATION: 99 % | HEART RATE: 53 BPM | RESPIRATION RATE: 16 BRPM

## 2021-08-05 DIAGNOSIS — I12.9: Primary | ICD-10-CM

## 2021-08-05 DIAGNOSIS — D46.9: ICD-10-CM

## 2021-08-05 DIAGNOSIS — D63.1: ICD-10-CM

## 2021-08-05 DIAGNOSIS — N18.9: ICD-10-CM

## 2021-08-05 PROCEDURE — 96372 THER/PROPH/DIAG INJ SC/IM: CPT

## 2021-08-19 ENCOUNTER — HOSPITAL ENCOUNTER (OUTPATIENT)
Age: 81
End: 2021-08-19
Payer: MEDICARE

## 2021-08-19 VITALS
TEMPERATURE: 98 F | SYSTOLIC BLOOD PRESSURE: 147 MMHG | HEART RATE: 56 BPM | RESPIRATION RATE: 17 BRPM | DIASTOLIC BLOOD PRESSURE: 75 MMHG | OXYGEN SATURATION: 99 %

## 2021-08-19 DIAGNOSIS — I12.9: Primary | ICD-10-CM

## 2021-08-19 DIAGNOSIS — D46.9: ICD-10-CM

## 2021-08-19 DIAGNOSIS — D63.1: ICD-10-CM

## 2021-08-19 DIAGNOSIS — N18.9: ICD-10-CM

## 2021-08-19 PROCEDURE — 96372 THER/PROPH/DIAG INJ SC/IM: CPT

## 2021-08-19 PROCEDURE — 99214 OFFICE O/P EST MOD 30 MIN: CPT

## 2021-08-30 ENCOUNTER — HOSPITAL ENCOUNTER (OUTPATIENT)
Age: 81
End: 2021-08-30
Payer: MEDICARE

## 2021-08-30 DIAGNOSIS — N17.9: Primary | ICD-10-CM

## 2021-08-30 DIAGNOSIS — N18.4: ICD-10-CM

## 2021-08-30 LAB
25(OH)D3+25(OH)D2 SERPL-MCNC: 39.6 NG/ML (ref 30–100)
APPEARANCE UR: CLEAR
BILIRUBIN URINE UA: NEGATIVE
BUN SERPL-MCNC: 49 MG/DL (ref 9–20)
CALCIUM SERPL-MCNC: 9 MG/DL (ref 8.4–10.2)
CHLORIDE SERPL-SCNC: 105 MMOL/L (ref 98–107)
CO2 SERPL-SCNC: 25 MMOL/L (ref 22–32)
COLOR UR: YELLOW
CULTURE INDICATED URINE: (no result)
ESTIMATED GLOMERULAR FILT RATE: 21.7 ML/MIN (ref 60–?)
GLUCOSE SERPL-MCNC: 98 MG/DL (ref 80–110)
GLUCOSE URINE UA: NEGATIVE G/DL
HEMOLYSIS: < 15 (ref 0–50)
HGB UR QL: NEGATIVE
KETONES URINE UA: NEGATIVE
LEUKOCYTE ESTERASE URINE UA: (no result)
MAGNESIUM SERPL-MCNC: 2.3 MG/DL (ref 1.6–2.3)
MICROALBUMI CREATININ RATIO UR: 933.3 UG/MG CR (ref ?–30)
NITRITE URINE UA: NEGATIVE
PH UR: 6 [PH] (ref 4.5–8)
PHOSPHATE SERPL-MCNC: 4.6 MG/DL (ref 2.3–3.7)
POTASSIUM SERPL-SCNC: 4.7 MMOL/L (ref 3.4–5.1)
PROTEIN URINE UA: (no result)
SODIUM SERPL-SCNC: 137 MMOL/L (ref 137–145)
SP GR UR: 1.01 (ref 1–1.03)
URATE SERPL-MCNC: 6.9 MG/DL (ref 3.5–8.5)
UROBILINOGEN UR QL: 0.2 E.U./DL

## 2021-08-30 PROCEDURE — 84100 ASSAY OF PHOSPHORUS: CPT

## 2021-08-30 PROCEDURE — 36415 COLL VENOUS BLD VENIPUNCTURE: CPT

## 2021-08-30 PROCEDURE — 84550 ASSAY OF BLOOD/URIC ACID: CPT

## 2021-08-30 PROCEDURE — 87086 URINE CULTURE/COLONY COUNT: CPT

## 2021-08-30 PROCEDURE — 83735 ASSAY OF MAGNESIUM: CPT

## 2021-08-30 PROCEDURE — 82043 UR ALBUMIN QUANTITATIVE: CPT

## 2021-08-30 PROCEDURE — 81001 URINALYSIS AUTO W/SCOPE: CPT

## 2021-08-30 PROCEDURE — 82570 ASSAY OF URINE CREATININE: CPT

## 2021-08-30 PROCEDURE — 83970 ASSAY OF PARATHORMONE: CPT

## 2021-08-30 PROCEDURE — 82306 VITAMIN D 25 HYDROXY: CPT

## 2021-08-30 PROCEDURE — 80048 BASIC METABOLIC PNL TOTAL CA: CPT

## 2021-09-02 ENCOUNTER — HOSPITAL ENCOUNTER (OUTPATIENT)
Age: 81
End: 2021-09-02
Payer: MEDICARE

## 2021-09-02 VITALS
HEART RATE: 56 BPM | OXYGEN SATURATION: 100 % | TEMPERATURE: 97.52 F | RESPIRATION RATE: 16 BRPM | SYSTOLIC BLOOD PRESSURE: 128 MMHG | DIASTOLIC BLOOD PRESSURE: 71 MMHG

## 2021-09-02 DIAGNOSIS — D63.1: ICD-10-CM

## 2021-09-02 DIAGNOSIS — N18.9: ICD-10-CM

## 2021-09-02 DIAGNOSIS — I12.9: Primary | ICD-10-CM

## 2021-09-02 DIAGNOSIS — D46.9: ICD-10-CM

## 2021-09-16 ENCOUNTER — HOSPITAL ENCOUNTER (OUTPATIENT)
Age: 81
End: 2021-09-16
Payer: MEDICARE

## 2021-09-16 VITALS
DIASTOLIC BLOOD PRESSURE: 66 MMHG | SYSTOLIC BLOOD PRESSURE: 142 MMHG | OXYGEN SATURATION: 98 % | RESPIRATION RATE: 16 BRPM | TEMPERATURE: 96.8 F | HEART RATE: 55 BPM

## 2021-09-16 DIAGNOSIS — D46.9: ICD-10-CM

## 2021-09-16 DIAGNOSIS — N18.30: ICD-10-CM

## 2021-09-16 DIAGNOSIS — D63.1: ICD-10-CM

## 2021-09-16 DIAGNOSIS — I12.9: Primary | ICD-10-CM

## 2021-09-16 PROCEDURE — 96372 THER/PROPH/DIAG INJ SC/IM: CPT

## 2021-09-16 PROCEDURE — 99214 OFFICE O/P EST MOD 30 MIN: CPT

## 2021-10-07 ENCOUNTER — HOSPITAL ENCOUNTER (OUTPATIENT)
Age: 81
End: 2021-10-07
Payer: MEDICARE

## 2021-10-07 DIAGNOSIS — D46.9: Primary | ICD-10-CM

## 2021-10-07 DIAGNOSIS — N18.9: ICD-10-CM

## 2021-10-07 DIAGNOSIS — I12.9: ICD-10-CM

## 2021-10-07 DIAGNOSIS — D63.1: ICD-10-CM

## 2021-10-07 PROCEDURE — 96372 THER/PROPH/DIAG INJ SC/IM: CPT

## 2021-10-07 PROCEDURE — 99214 OFFICE O/P EST MOD 30 MIN: CPT

## 2021-11-04 ENCOUNTER — HOSPITAL ENCOUNTER (OUTPATIENT)
Age: 81
End: 2021-11-04
Payer: MEDICARE

## 2021-11-04 DIAGNOSIS — I12.9: ICD-10-CM

## 2021-11-04 DIAGNOSIS — D46.9: Primary | ICD-10-CM

## 2021-11-04 DIAGNOSIS — N18.9: ICD-10-CM

## 2021-11-04 DIAGNOSIS — D63.1: ICD-10-CM

## 2021-11-04 PROCEDURE — 99214 OFFICE O/P EST MOD 30 MIN: CPT

## 2021-11-04 PROCEDURE — 96372 THER/PROPH/DIAG INJ SC/IM: CPT

## 2021-11-18 ENCOUNTER — HOSPITAL ENCOUNTER (OUTPATIENT)
Age: 81
End: 2021-11-18
Payer: MEDICARE

## 2021-11-18 VITALS
SYSTOLIC BLOOD PRESSURE: 138 MMHG | OXYGEN SATURATION: 99 % | DIASTOLIC BLOOD PRESSURE: 63 MMHG | RESPIRATION RATE: 18 BRPM | TEMPERATURE: 97.52 F | HEART RATE: 56 BPM

## 2021-11-18 DIAGNOSIS — I12.9: Primary | ICD-10-CM

## 2021-11-18 DIAGNOSIS — N18.32: ICD-10-CM

## 2021-11-18 DIAGNOSIS — D46.9: ICD-10-CM

## 2021-11-18 DIAGNOSIS — D63.1: ICD-10-CM

## 2021-11-18 PROCEDURE — 96372 THER/PROPH/DIAG INJ SC/IM: CPT

## 2021-12-02 ENCOUNTER — HOSPITAL ENCOUNTER (OUTPATIENT)
Age: 81
End: 2021-12-02
Payer: MEDICARE

## 2021-12-02 DIAGNOSIS — I12.9: ICD-10-CM

## 2021-12-02 DIAGNOSIS — D63.1: ICD-10-CM

## 2021-12-02 DIAGNOSIS — N18.9: ICD-10-CM

## 2021-12-02 DIAGNOSIS — D46.9: Primary | ICD-10-CM

## 2021-12-02 PROCEDURE — 99214 OFFICE O/P EST MOD 30 MIN: CPT

## 2021-12-02 PROCEDURE — 96372 THER/PROPH/DIAG INJ SC/IM: CPT

## 2021-12-16 ENCOUNTER — HOSPITAL ENCOUNTER (OUTPATIENT)
Age: 81
End: 2021-12-16
Payer: MEDICARE

## 2021-12-16 VITALS
RESPIRATION RATE: 16 BRPM | SYSTOLIC BLOOD PRESSURE: 134 MMHG | DIASTOLIC BLOOD PRESSURE: 69 MMHG | OXYGEN SATURATION: 98 % | TEMPERATURE: 98.24 F | HEART RATE: 60 BPM

## 2021-12-16 DIAGNOSIS — D63.1: ICD-10-CM

## 2021-12-16 DIAGNOSIS — I12.9: Primary | ICD-10-CM

## 2021-12-16 DIAGNOSIS — N18.30: ICD-10-CM

## 2021-12-16 DIAGNOSIS — D46.9: ICD-10-CM

## 2021-12-16 PROCEDURE — 96372 THER/PROPH/DIAG INJ SC/IM: CPT

## 2021-12-25 ENCOUNTER — HOSPITAL ENCOUNTER (EMERGENCY)
Age: 81
Discharge: ANOTHER HOSPITAL | End: 2021-12-25
Payer: MEDICARE

## 2021-12-25 VITALS
OXYGEN SATURATION: 97 % | SYSTOLIC BLOOD PRESSURE: 121 MMHG | DIASTOLIC BLOOD PRESSURE: 64 MMHG | HEART RATE: 79 BPM | RESPIRATION RATE: 17 BRPM

## 2021-12-25 VITALS
SYSTOLIC BLOOD PRESSURE: 155 MMHG | DIASTOLIC BLOOD PRESSURE: 73 MMHG | RESPIRATION RATE: 21 BRPM | OXYGEN SATURATION: 91 % | HEART RATE: 90 BPM

## 2021-12-25 VITALS — OXYGEN SATURATION: 92 % | HEART RATE: 82 BPM | RESPIRATION RATE: 17 BRPM

## 2021-12-25 VITALS
HEART RATE: 74 BPM | SYSTOLIC BLOOD PRESSURE: 130 MMHG | OXYGEN SATURATION: 96 % | DIASTOLIC BLOOD PRESSURE: 63 MMHG | RESPIRATION RATE: 21 BRPM

## 2021-12-25 VITALS
DIASTOLIC BLOOD PRESSURE: 62 MMHG | HEART RATE: 91 BPM | SYSTOLIC BLOOD PRESSURE: 135 MMHG | OXYGEN SATURATION: 91 % | RESPIRATION RATE: 21 BRPM

## 2021-12-25 VITALS — TEMPERATURE: 102.92 F

## 2021-12-25 VITALS
DIASTOLIC BLOOD PRESSURE: 62 MMHG | OXYGEN SATURATION: 96 % | HEART RATE: 75 BPM | RESPIRATION RATE: 22 BRPM | SYSTOLIC BLOOD PRESSURE: 134 MMHG

## 2021-12-25 VITALS
OXYGEN SATURATION: 92 % | DIASTOLIC BLOOD PRESSURE: 78 MMHG | HEART RATE: 97 BPM | SYSTOLIC BLOOD PRESSURE: 167 MMHG | RESPIRATION RATE: 21 BRPM

## 2021-12-25 VITALS
OXYGEN SATURATION: 95 % | SYSTOLIC BLOOD PRESSURE: 129 MMHG | RESPIRATION RATE: 20 BRPM | DIASTOLIC BLOOD PRESSURE: 62 MMHG | HEART RATE: 85 BPM

## 2021-12-25 VITALS
RESPIRATION RATE: 13 BRPM | DIASTOLIC BLOOD PRESSURE: 65 MMHG | OXYGEN SATURATION: 97 % | HEART RATE: 78 BPM | SYSTOLIC BLOOD PRESSURE: 124 MMHG

## 2021-12-25 VITALS
HEART RATE: 83 BPM | RESPIRATION RATE: 21 BRPM | SYSTOLIC BLOOD PRESSURE: 165 MMHG | OXYGEN SATURATION: 92 % | DIASTOLIC BLOOD PRESSURE: 85 MMHG

## 2021-12-25 VITALS
DIASTOLIC BLOOD PRESSURE: 67 MMHG | HEART RATE: 81 BPM | OXYGEN SATURATION: 93 % | RESPIRATION RATE: 20 BRPM | SYSTOLIC BLOOD PRESSURE: 140 MMHG

## 2021-12-25 VITALS
DIASTOLIC BLOOD PRESSURE: 61 MMHG | OXYGEN SATURATION: 96 % | HEART RATE: 79 BPM | RESPIRATION RATE: 21 BRPM | SYSTOLIC BLOOD PRESSURE: 113 MMHG

## 2021-12-25 VITALS
TEMPERATURE: 98.96 F | HEART RATE: 91 BPM | SYSTOLIC BLOOD PRESSURE: 147 MMHG | DIASTOLIC BLOOD PRESSURE: 80 MMHG | OXYGEN SATURATION: 90 % | RESPIRATION RATE: 29 BRPM

## 2021-12-25 VITALS — HEART RATE: 92 BPM | OXYGEN SATURATION: 93 % | RESPIRATION RATE: 20 BRPM

## 2021-12-25 VITALS — SYSTOLIC BLOOD PRESSURE: 147 MMHG | DIASTOLIC BLOOD PRESSURE: 80 MMHG

## 2021-12-25 VITALS
HEART RATE: 80 BPM | OXYGEN SATURATION: 96 % | DIASTOLIC BLOOD PRESSURE: 63 MMHG | RESPIRATION RATE: 24 BRPM | SYSTOLIC BLOOD PRESSURE: 131 MMHG

## 2021-12-25 VITALS
DIASTOLIC BLOOD PRESSURE: 72 MMHG | HEART RATE: 84 BPM | SYSTOLIC BLOOD PRESSURE: 165 MMHG | OXYGEN SATURATION: 93 % | RESPIRATION RATE: 15 BRPM

## 2021-12-25 VITALS — RESPIRATION RATE: 24 BRPM | HEART RATE: 80 BPM | OXYGEN SATURATION: 96 %

## 2021-12-25 VITALS — TEMPERATURE: 100.76 F

## 2021-12-25 VITALS — RESPIRATION RATE: 19 BRPM | OXYGEN SATURATION: 93 % | HEART RATE: 85 BPM

## 2021-12-25 VITALS — TEMPERATURE: 100.5 F

## 2021-12-25 VITALS — BODY MASS INDEX: 26.8 KG/M2

## 2021-12-25 DIAGNOSIS — Z20.822: ICD-10-CM

## 2021-12-25 DIAGNOSIS — I11.0: ICD-10-CM

## 2021-12-25 DIAGNOSIS — I12.9: ICD-10-CM

## 2021-12-25 DIAGNOSIS — D69.6: ICD-10-CM

## 2021-12-25 DIAGNOSIS — D46.9: ICD-10-CM

## 2021-12-25 DIAGNOSIS — Z87.891: ICD-10-CM

## 2021-12-25 DIAGNOSIS — I21.4: Primary | ICD-10-CM

## 2021-12-25 LAB
ADD MANUAL DIFF / SLIDE REVIEW: NO
ALBUMIN SERPL-MCNC: 3.6 G/DL (ref 3.5–5)
ALBUMIN/GLOB SERPL: 1.3 {RATIO} (ref 1–2.8)
ALP SERPL-CCNC: 59 U/L (ref 38–126)
ALT SERPL-CCNC: 15 IU/L (ref ?–50)
BUN SERPL-MCNC: 51 MG/DL (ref 9–20)
CALCIUM SERPL-MCNC: 8.5 MG/DL (ref 8.4–10.2)
CHLORIDE SERPL-SCNC: 109 MMOL/L (ref 98–107)
CK SERPL-CCNC: 117 U/L (ref 55–170)
CKMB % RELATIVE INDEX: 2.1 % (ref 1.5–5)
CO2 SERPL-SCNC: 26 MMOL/L (ref 22–32)
ESTIMATED GLOMERULAR FILT RATE: 19.8 ML/MIN (ref 60–?)
GLOBULIN SER CALC-MCNC: 2.7 G/DL (ref 1.7–4.1)
GLUCOSE SERPL-MCNC: 123 MG/DL (ref 80–110)
HEMATOCRIT: 29.3 % (ref 41–53)
HEMOGLOBIN: 9.7 G/DL (ref 13.5–17.5)
HEMOLYSIS: < 15 (ref 0–50)
LACTATE SERPL-MCNC: 0.8 MMOL/L (ref 0.7–2.1)
LYMPHOCYTES # SPEC AUTO: 400 /UL (ref 1100–4500)
MAGNESIUM SERPL-MCNC: 2.2 MG/DL (ref 1.6–2.3)
MCV RBC: 95.5 FL (ref 80–100)
MEAN CORPUSCULAR HEMOGLOBIN: 31.5 PG (ref 26–34)
MEAN CORPUSCULAR HGB CONC: 33 % (ref 30–36)
NT-PROBNP SERPL-MCNC: 9000 PG/ML (ref ?–450)
PLATELET COUNT: 96 X10^3/UL (ref 150–400)
POTASSIUM SERPL-SCNC: 4.5 MMOL/L (ref 3.4–5.1)
PROCALCITONIN SERPL-MCNC: 0.09 NG/ML (ref ?–0.5)
PROT SERPL-MCNC: 6.3 G/DL (ref 6.3–8.2)
SODIUM SERPL-SCNC: 139 MMOL/L (ref 137–145)
TROPONIN I SERPL-MCNC: 0.1 NG/ML (ref 0.01–0.03)
TROPONIN I SERPL-MCNC: 0.31 NG/ML (ref 0.01–0.03)

## 2021-12-25 PROCEDURE — 82550 ASSAY OF CK (CPK): CPT

## 2021-12-25 PROCEDURE — 85379 FIBRIN DEGRADATION QUANT: CPT

## 2021-12-25 PROCEDURE — 84484 ASSAY OF TROPONIN QUANT: CPT

## 2021-12-25 PROCEDURE — 87635 SARS-COV-2 COVID-19 AMP PRB: CPT

## 2021-12-25 PROCEDURE — 36415 COLL VENOUS BLD VENIPUNCTURE: CPT

## 2021-12-25 PROCEDURE — 84145 PROCALCITONIN (PCT): CPT

## 2021-12-25 PROCEDURE — 96365 THER/PROPH/DIAG IV INF INIT: CPT

## 2021-12-25 PROCEDURE — 96375 TX/PRO/DX INJ NEW DRUG ADDON: CPT

## 2021-12-25 PROCEDURE — 99284 EMERGENCY DEPT VISIT MOD MDM: CPT

## 2021-12-25 PROCEDURE — 86900 BLOOD TYPING SEROLOGIC ABO: CPT

## 2021-12-25 PROCEDURE — 83735 ASSAY OF MAGNESIUM: CPT

## 2021-12-25 PROCEDURE — 80053 COMPREHEN METABOLIC PANEL: CPT

## 2021-12-25 PROCEDURE — 83880 ASSAY OF NATRIURETIC PEPTIDE: CPT

## 2021-12-25 PROCEDURE — 87040 BLOOD CULTURE FOR BACTERIA: CPT

## 2021-12-25 PROCEDURE — 86901 BLOOD TYPING SEROLOGIC RH(D): CPT

## 2021-12-25 PROCEDURE — 82553 CREATINE MB FRACTION: CPT

## 2021-12-25 PROCEDURE — 83605 ASSAY OF LACTIC ACID: CPT

## 2021-12-25 PROCEDURE — 99285 EMERGENCY DEPT VISIT HI MDM: CPT

## 2021-12-25 PROCEDURE — 86850 RBC ANTIBODY SCREEN: CPT

## 2021-12-25 PROCEDURE — 93005 ELECTROCARDIOGRAM TRACING: CPT

## 2021-12-25 PROCEDURE — 81003 URINALYSIS AUTO W/O SCOPE: CPT

## 2021-12-25 PROCEDURE — 85025 COMPLETE CBC W/AUTO DIFF WBC: CPT

## 2021-12-25 PROCEDURE — 71045 X-RAY EXAM CHEST 1 VIEW: CPT

## 2022-01-04 ENCOUNTER — HOSPITAL ENCOUNTER (OUTPATIENT)
Age: 82
End: 2022-01-04
Payer: MEDICARE

## 2022-01-04 DIAGNOSIS — N18.4: Primary | ICD-10-CM

## 2022-01-04 LAB
ADD MANUAL DIFF / SLIDE REVIEW: YES
BAND NEUTROPHILS PERCENT: 6 % (ref 3–7)
BASOPHILS NFR SPEC MANUAL: 2 % (ref 0–1)
BUN SERPL-MCNC: 82 MG/DL (ref 9–20)
CALCIUM SERPL-MCNC: 9.5 MG/DL (ref 8.4–10.2)
CHLORIDE SERPL-SCNC: 106 MMOL/L (ref 98–107)
CO2 SERPL-SCNC: 29 MMOL/L (ref 22–32)
DACRYOCYTES BLD QL SMEAR: (no result)
EOSINOPHILS PERCENT MANUAL: 2 % (ref 2–4)
ESTIMATED GLOMERULAR FILT RATE: 17.4 ML/MIN (ref 60–?)
GLUCOSE SERPL-MCNC: 98 MG/DL (ref 80–110)
HEMATOCRIT: 24.3 % (ref 41–53)
HEMOGLOBIN: 8.1 G/DL (ref 13.5–17.5)
HEMOLYSIS: < 15 (ref 0–50)
LYMPHOCYTES PERCENT MANUAL: 10 % (ref 25–45)
MAGNESIUM SERPL-MCNC: 2.4 MG/DL (ref 1.6–2.3)
MCV RBC: 96.4 FL (ref 80–100)
MEAN CORPUSCULAR HEMOGLOBIN: 32 PG (ref 26–34)
MEAN CORPUSCULAR HGB CONC: 33.2 % (ref 30–36)
METAMYELOCYTES PERCENT: 3 % (ref ?–0)
MICROALBUMI CREATININ RATIO UR: 214.7 UG/MG CR (ref ?–30)
MICROCYTOSIS: (no result)
MONOCYTES PERCENT MANUAL: 12 % (ref 2–11)
MYELOCYTES PERCENT: 2 % (ref ?–0)
NEUTROPHILS ABSOLUTE MANUAL: 2829 /UL (ref 3000–5900)
NEUTS SEG NFR BLD: 63 % (ref 38–70)
PHOSPHATE SERPL-MCNC: 4.9 MG/DL (ref 2.3–3.7)
PLATELET COUNT: 114 X10^3/UL (ref 150–400)
POIKILOCYTOSIS: (no result)
POLYCHROMASIA BLD QL SMEAR: (no result)
POTASSIUM SERPL-SCNC: 5 MMOL/L (ref 3.4–5.1)
SODIUM SERPL-SCNC: 139 MMOL/L (ref 137–145)
SPHEROCYTES: (no result)
TOTAL CELLS COUNTED: 100
URATE SERPL-MCNC: 9.6 MG/DL (ref 3.5–8.5)

## 2022-01-04 PROCEDURE — 85007 BL SMEAR W/DIFF WBC COUNT: CPT

## 2022-01-04 PROCEDURE — 85025 COMPLETE CBC W/AUTO DIFF WBC: CPT

## 2022-01-04 PROCEDURE — 80048 BASIC METABOLIC PNL TOTAL CA: CPT

## 2022-01-04 PROCEDURE — 84100 ASSAY OF PHOSPHORUS: CPT

## 2022-01-04 PROCEDURE — 82043 UR ALBUMIN QUANTITATIVE: CPT

## 2022-01-04 PROCEDURE — 84550 ASSAY OF BLOOD/URIC ACID: CPT

## 2022-01-04 PROCEDURE — 36415 COLL VENOUS BLD VENIPUNCTURE: CPT

## 2022-01-04 PROCEDURE — 82570 ASSAY OF URINE CREATININE: CPT

## 2022-01-04 PROCEDURE — 83735 ASSAY OF MAGNESIUM: CPT

## 2022-01-13 ENCOUNTER — HOSPITAL ENCOUNTER (OUTPATIENT)
Age: 82
End: 2022-01-13
Payer: MEDICARE

## 2022-01-13 VITALS
RESPIRATION RATE: 16 BRPM | HEART RATE: 70 BPM | SYSTOLIC BLOOD PRESSURE: 106 MMHG | TEMPERATURE: 98.4 F | OXYGEN SATURATION: 94 % | DIASTOLIC BLOOD PRESSURE: 64 MMHG

## 2022-01-13 DIAGNOSIS — N18.4: ICD-10-CM

## 2022-01-13 DIAGNOSIS — I12.9: ICD-10-CM

## 2022-01-13 DIAGNOSIS — R06.02: ICD-10-CM

## 2022-01-13 DIAGNOSIS — D63.1: ICD-10-CM

## 2022-01-13 DIAGNOSIS — D46.9: Primary | ICD-10-CM

## 2022-01-13 DIAGNOSIS — E78.5: ICD-10-CM

## 2022-01-13 PROCEDURE — 36430 TRANSFUSION BLD/BLD COMPNT: CPT

## 2022-01-13 PROCEDURE — 86850 RBC ANTIBODY SCREEN: CPT

## 2022-01-13 PROCEDURE — 99214 OFFICE O/P EST MOD 30 MIN: CPT

## 2022-01-13 PROCEDURE — 96372 THER/PROPH/DIAG INJ SC/IM: CPT

## 2022-01-13 PROCEDURE — 86901 BLOOD TYPING SEROLOGIC RH(D): CPT

## 2022-01-13 PROCEDURE — 86900 BLOOD TYPING SEROLOGIC ABO: CPT

## 2022-01-13 PROCEDURE — 36415 COLL VENOUS BLD VENIPUNCTURE: CPT

## 2022-01-27 ENCOUNTER — HOSPITAL ENCOUNTER (OUTPATIENT)
Age: 82
End: 2022-01-27
Payer: MEDICARE

## 2022-01-27 DIAGNOSIS — N40.1: ICD-10-CM

## 2022-01-27 DIAGNOSIS — D63.1: ICD-10-CM

## 2022-01-27 DIAGNOSIS — N18.4: ICD-10-CM

## 2022-01-27 DIAGNOSIS — I13.0: ICD-10-CM

## 2022-01-27 DIAGNOSIS — E78.5: ICD-10-CM

## 2022-01-27 DIAGNOSIS — I34.0: ICD-10-CM

## 2022-01-27 DIAGNOSIS — D46.9: Primary | ICD-10-CM

## 2022-01-27 DIAGNOSIS — I50.31: ICD-10-CM

## 2022-01-27 PROCEDURE — 36430 TRANSFUSION BLD/BLD COMPNT: CPT

## 2022-01-27 PROCEDURE — 86901 BLOOD TYPING SEROLOGIC RH(D): CPT

## 2022-01-27 PROCEDURE — 99214 OFFICE O/P EST MOD 30 MIN: CPT

## 2022-01-27 PROCEDURE — 96372 THER/PROPH/DIAG INJ SC/IM: CPT

## 2022-01-27 PROCEDURE — 86850 RBC ANTIBODY SCREEN: CPT

## 2022-01-27 PROCEDURE — 86900 BLOOD TYPING SEROLOGIC ABO: CPT

## 2022-01-28 ENCOUNTER — HOSPITAL ENCOUNTER (INPATIENT)
Dept: HOSPITAL 73 - ED | Age: 82
LOS: 5 days | Discharge: HOME | DRG: 368 | End: 2022-02-02
Payer: MEDICARE

## 2022-01-28 VITALS
TEMPERATURE: 98.06 F | SYSTOLIC BLOOD PRESSURE: 140 MMHG | OXYGEN SATURATION: 100 % | RESPIRATION RATE: 18 BRPM | DIASTOLIC BLOOD PRESSURE: 73 MMHG | HEART RATE: 67 BPM

## 2022-01-28 VITALS
TEMPERATURE: 97.88 F | RESPIRATION RATE: 18 BRPM | SYSTOLIC BLOOD PRESSURE: 143 MMHG | HEART RATE: 65 BPM | DIASTOLIC BLOOD PRESSURE: 65 MMHG

## 2022-01-28 VITALS
RESPIRATION RATE: 18 BRPM | OXYGEN SATURATION: 100 % | DIASTOLIC BLOOD PRESSURE: 80 MMHG | HEART RATE: 65 BPM | SYSTOLIC BLOOD PRESSURE: 148 MMHG | TEMPERATURE: 97.34 F

## 2022-01-28 VITALS
SYSTOLIC BLOOD PRESSURE: 130 MMHG | HEART RATE: 61 BPM | DIASTOLIC BLOOD PRESSURE: 60 MMHG | OXYGEN SATURATION: 98 % | RESPIRATION RATE: 15 BRPM

## 2022-01-28 VITALS
OXYGEN SATURATION: 100 % | SYSTOLIC BLOOD PRESSURE: 143 MMHG | HEART RATE: 65 BPM | RESPIRATION RATE: 17 BRPM | DIASTOLIC BLOOD PRESSURE: 65 MMHG | TEMPERATURE: 98 F

## 2022-01-28 VITALS — BODY MASS INDEX: 25 KG/M2 | BODY MASS INDEX: 25.1 KG/M2

## 2022-01-28 VITALS
OXYGEN SATURATION: 97 % | RESPIRATION RATE: 15 BRPM | HEART RATE: 67 BPM | SYSTOLIC BLOOD PRESSURE: 137 MMHG | DIASTOLIC BLOOD PRESSURE: 64 MMHG

## 2022-01-28 VITALS
TEMPERATURE: 97.34 F | HEART RATE: 65 BPM | RESPIRATION RATE: 18 BRPM | DIASTOLIC BLOOD PRESSURE: 73 MMHG | SYSTOLIC BLOOD PRESSURE: 139 MMHG

## 2022-01-28 VITALS
RESPIRATION RATE: 18 BRPM | DIASTOLIC BLOOD PRESSURE: 77 MMHG | HEART RATE: 62 BPM | SYSTOLIC BLOOD PRESSURE: 149 MMHG | TEMPERATURE: 97.52 F

## 2022-01-28 VITALS
HEART RATE: 60 BPM | DIASTOLIC BLOOD PRESSURE: 80 MMHG | OXYGEN SATURATION: 100 % | RESPIRATION RATE: 16 BRPM | TEMPERATURE: 97.3 F | SYSTOLIC BLOOD PRESSURE: 148 MMHG

## 2022-01-28 VITALS — HEART RATE: 70 BPM | OXYGEN SATURATION: 99 % | SYSTOLIC BLOOD PRESSURE: 121 MMHG | DIASTOLIC BLOOD PRESSURE: 62 MMHG

## 2022-01-28 VITALS
HEART RATE: 64 BPM | RESPIRATION RATE: 18 BRPM | SYSTOLIC BLOOD PRESSURE: 133 MMHG | TEMPERATURE: 98 F | DIASTOLIC BLOOD PRESSURE: 60 MMHG

## 2022-01-28 VITALS
OXYGEN SATURATION: 100 % | RESPIRATION RATE: 17 BRPM | SYSTOLIC BLOOD PRESSURE: 155 MMHG | HEART RATE: 66 BPM | DIASTOLIC BLOOD PRESSURE: 72 MMHG

## 2022-01-28 VITALS
OXYGEN SATURATION: 100 % | RESPIRATION RATE: 15 BRPM | HEART RATE: 62 BPM | DIASTOLIC BLOOD PRESSURE: 64 MMHG | SYSTOLIC BLOOD PRESSURE: 134 MMHG

## 2022-01-28 VITALS
HEART RATE: 63 BPM | RESPIRATION RATE: 18 BRPM | TEMPERATURE: 98 F | SYSTOLIC BLOOD PRESSURE: 133 MMHG | DIASTOLIC BLOOD PRESSURE: 60 MMHG | OXYGEN SATURATION: 100 %

## 2022-01-28 VITALS — RESPIRATION RATE: 21 BRPM | HEART RATE: 68 BPM | OXYGEN SATURATION: 100 %

## 2022-01-28 VITALS
RESPIRATION RATE: 18 BRPM | DIASTOLIC BLOOD PRESSURE: 64 MMHG | HEART RATE: 60 BPM | SYSTOLIC BLOOD PRESSURE: 134 MMHG | TEMPERATURE: 97.88 F

## 2022-01-28 VITALS
RESPIRATION RATE: 14 BRPM | OXYGEN SATURATION: 99 % | DIASTOLIC BLOOD PRESSURE: 64 MMHG | HEART RATE: 62 BPM | SYSTOLIC BLOOD PRESSURE: 154 MMHG

## 2022-01-28 VITALS
SYSTOLIC BLOOD PRESSURE: 140 MMHG | DIASTOLIC BLOOD PRESSURE: 73 MMHG | HEART RATE: 67 BPM | TEMPERATURE: 98 F | RESPIRATION RATE: 18 BRPM

## 2022-01-28 VITALS — OXYGEN SATURATION: 100 %

## 2022-01-28 VITALS — DIASTOLIC BLOOD PRESSURE: 61 MMHG | HEART RATE: 75 BPM | SYSTOLIC BLOOD PRESSURE: 120 MMHG | OXYGEN SATURATION: 99 %

## 2022-01-28 VITALS — SYSTOLIC BLOOD PRESSURE: 147 MMHG | DIASTOLIC BLOOD PRESSURE: 69 MMHG | TEMPERATURE: 97.88 F

## 2022-01-28 VITALS
HEART RATE: 68 BPM | OXYGEN SATURATION: 100 % | TEMPERATURE: 97.88 F | RESPIRATION RATE: 18 BRPM | DIASTOLIC BLOOD PRESSURE: 69 MMHG | SYSTOLIC BLOOD PRESSURE: 141 MMHG

## 2022-01-28 VITALS — OXYGEN SATURATION: 99 %

## 2022-01-28 DIAGNOSIS — E43: ICD-10-CM

## 2022-01-28 DIAGNOSIS — D62: ICD-10-CM

## 2022-01-28 DIAGNOSIS — K29.71: ICD-10-CM

## 2022-01-28 DIAGNOSIS — I34.0: ICD-10-CM

## 2022-01-28 DIAGNOSIS — M10.9: ICD-10-CM

## 2022-01-28 DIAGNOSIS — D46.9: ICD-10-CM

## 2022-01-28 DIAGNOSIS — N40.1: ICD-10-CM

## 2022-01-28 DIAGNOSIS — I13.0: ICD-10-CM

## 2022-01-28 DIAGNOSIS — I50.31: ICD-10-CM

## 2022-01-28 DIAGNOSIS — E78.5: ICD-10-CM

## 2022-01-28 DIAGNOSIS — N18.4: ICD-10-CM

## 2022-01-28 DIAGNOSIS — Z20.822: ICD-10-CM

## 2022-01-28 DIAGNOSIS — K20.91: Primary | ICD-10-CM

## 2022-01-28 DIAGNOSIS — D63.1: ICD-10-CM

## 2022-01-28 DIAGNOSIS — K29.81: ICD-10-CM

## 2022-01-28 DIAGNOSIS — I12.9: ICD-10-CM

## 2022-01-28 DIAGNOSIS — Z87.891: ICD-10-CM

## 2022-01-28 LAB
ADD MANUAL DIFF / SLIDE REVIEW: (no result)
ALBUMIN SERPL-MCNC: 2.7 G/DL (ref 3.5–5)
ALBUMIN/GLOB SERPL: 1.2 {RATIO} (ref 1–2.8)
ALP SERPL-CCNC: 49 U/L (ref 38–126)
ALT SERPL-CCNC: 12 IU/L (ref ?–50)
ANISOCYTOSIS BLD QL: (no result)
BUN SERPL-MCNC: 73 MG/DL (ref 9–20)
CALCIUM SERPL-MCNC: 7.9 MG/DL (ref 8.4–10.2)
CHLORIDE SERPL-SCNC: 112 MMOL/L (ref 98–107)
CO2 SERPL-SCNC: 29 MMOL/L (ref 22–32)
ESTIMATED GLOMERULAR FILT RATE: 23.7 ML/MIN (ref 60–?)
GLOBULIN SER CALC-MCNC: 2.2 G/DL (ref 1.7–4.1)
GLUCOSE SERPL-MCNC: 100 MG/DL (ref 80–110)
HEMATOCRIT: 20 % (ref 41–53)
HEMATOCRIT: 26.1 % (ref 41–53)
HEMOGLOBIN: 6.7 G/DL (ref 13.5–17.5)
HEMOGLOBIN: 8.9 G/DL (ref 13.5–17.5)
HEMOLYSIS: < 15 (ref 0–50)
INR PPP: 1.2 (ref 0.9–1.3)
LYMPHOCYTES # SPEC AUTO: 900 /UL (ref 1100–4500)
MCV RBC: 95.5 FL (ref 80–100)
MEAN CORPUSCULAR HEMOGLOBIN: 32 PG (ref 26–34)
MEAN CORPUSCULAR HGB CONC: 33.5 % (ref 30–36)
PLATELET COUNT: 73 X10^3/UL (ref 150–400)
POTASSIUM SERPL-SCNC: 4.6 MMOL/L (ref 3.4–5.1)
PROT SERPL-MCNC: 4.9 G/DL (ref 6.3–8.2)
PROTHROMBIN TIME: 13.4 SECONDS (ref 10.1–12.7)
SODIUM SERPL-SCNC: 141 MMOL/L (ref 137–145)

## 2022-01-28 PROCEDURE — 99284 EMERGENCY DEPT VISIT MOD MDM: CPT

## 2022-01-28 PROCEDURE — 87635 SARS-COV-2 COVID-19 AMP PRB: CPT

## 2022-01-28 PROCEDURE — P9035 PLATELET PHERES LEUKOREDUCED: HCPCS

## 2022-01-28 PROCEDURE — 36430 TRANSFUSION BLD/BLD COMPNT: CPT

## 2022-01-28 PROCEDURE — 36415 COLL VENOUS BLD VENIPUNCTURE: CPT

## 2022-01-28 PROCEDURE — 86850 RBC ANTIBODY SCREEN: CPT

## 2022-01-28 PROCEDURE — 85014 HEMATOCRIT: CPT

## 2022-01-28 PROCEDURE — 99214 OFFICE O/P EST MOD 30 MIN: CPT

## 2022-01-28 PROCEDURE — 86900 BLOOD TYPING SEROLOGIC ABO: CPT

## 2022-01-28 PROCEDURE — 96372 THER/PROPH/DIAG INJ SC/IM: CPT

## 2022-01-28 PROCEDURE — 85025 COMPLETE CBC W/AUTO DIFF WBC: CPT

## 2022-01-28 PROCEDURE — 85018 HEMOGLOBIN: CPT

## 2022-01-28 PROCEDURE — 99238 HOSP IP/OBS DSCHRG MGMT 30/<: CPT

## 2022-01-28 PROCEDURE — 99225: CPT

## 2022-01-28 PROCEDURE — 80048 BASIC METABOLIC PNL TOTAL CA: CPT

## 2022-01-28 PROCEDURE — 85610 PROTHROMBIN TIME: CPT

## 2022-01-28 PROCEDURE — 99232 SBSQ HOSP IP/OBS MODERATE 35: CPT

## 2022-01-28 PROCEDURE — 86901 BLOOD TYPING SEROLOGIC RH(D): CPT

## 2022-01-28 PROCEDURE — 99291 CRITICAL CARE FIRST HOUR: CPT

## 2022-01-28 PROCEDURE — 99233 SBSQ HOSP IP/OBS HIGH 50: CPT

## 2022-01-28 PROCEDURE — 96374 THER/PROPH/DIAG INJ IV PUSH: CPT

## 2022-01-28 PROCEDURE — 80053 COMPREHEN METABOLIC PANEL: CPT

## 2022-01-28 PROCEDURE — C9113 INJ PANTOPRAZOLE SODIUM, VIA: HCPCS

## 2022-01-28 PROCEDURE — G0378 HOSPITAL OBSERVATION PER HR: HCPCS

## 2022-01-28 PROCEDURE — 99219: CPT

## 2022-01-28 PROCEDURE — 94760 N-INVAS EAR/PLS OXIMETRY 1: CPT

## 2022-01-28 RX ADMIN — ATORVASTATIN CALCIUM 40 MG: 20 TABLET, FILM COATED ORAL at 12:10

## 2022-01-28 RX ADMIN — CHOLECALCIFEROL TAB 25 MCG (1000 UNIT) 1000 UNIT: 25 TAB at 12:11

## 2022-01-28 RX ADMIN — PANTOPRAZOLE SODIUM 40 MG: 40 INJECTION, POWDER, FOR SOLUTION INTRAVENOUS at 20:18

## 2022-01-28 RX ADMIN — PANTOPRAZOLE SODIUM 40 MG: 40 INJECTION, POWDER, FOR SOLUTION INTRAVENOUS at 07:58

## 2022-01-29 VITALS
RESPIRATION RATE: 18 BRPM | SYSTOLIC BLOOD PRESSURE: 149 MMHG | TEMPERATURE: 98.24 F | HEART RATE: 77 BPM | DIASTOLIC BLOOD PRESSURE: 76 MMHG

## 2022-01-29 VITALS
DIASTOLIC BLOOD PRESSURE: 75 MMHG | HEART RATE: 75 BPM | RESPIRATION RATE: 18 BRPM | TEMPERATURE: 97.8 F | SYSTOLIC BLOOD PRESSURE: 141 MMHG

## 2022-01-29 VITALS
HEART RATE: 77 BPM | TEMPERATURE: 97.34 F | SYSTOLIC BLOOD PRESSURE: 139 MMHG | RESPIRATION RATE: 17 BRPM | OXYGEN SATURATION: 100 % | DIASTOLIC BLOOD PRESSURE: 79 MMHG

## 2022-01-29 VITALS
RESPIRATION RATE: 17 BRPM | DIASTOLIC BLOOD PRESSURE: 73 MMHG | OXYGEN SATURATION: 98 % | SYSTOLIC BLOOD PRESSURE: 121 MMHG | HEART RATE: 76 BPM | TEMPERATURE: 98.2 F

## 2022-01-29 VITALS
DIASTOLIC BLOOD PRESSURE: 76 MMHG | HEART RATE: 77 BPM | OXYGEN SATURATION: 98 % | SYSTOLIC BLOOD PRESSURE: 149 MMHG | TEMPERATURE: 98.24 F | RESPIRATION RATE: 18 BRPM

## 2022-01-29 VITALS — OXYGEN SATURATION: 97 %

## 2022-01-29 VITALS
DIASTOLIC BLOOD PRESSURE: 69 MMHG | HEART RATE: 73 BPM | OXYGEN SATURATION: 100 % | SYSTOLIC BLOOD PRESSURE: 131 MMHG | RESPIRATION RATE: 17 BRPM | TEMPERATURE: 97.9 F

## 2022-01-29 VITALS
OXYGEN SATURATION: 100 % | HEART RATE: 80 BPM | RESPIRATION RATE: 19 BRPM | SYSTOLIC BLOOD PRESSURE: 149 MMHG | DIASTOLIC BLOOD PRESSURE: 82 MMHG | TEMPERATURE: 97.4 F

## 2022-01-29 VITALS
HEART RATE: 75 BPM | RESPIRATION RATE: 18 BRPM | SYSTOLIC BLOOD PRESSURE: 141 MMHG | DIASTOLIC BLOOD PRESSURE: 75 MMHG | TEMPERATURE: 97.8 F

## 2022-01-29 VITALS
HEART RATE: 72 BPM | SYSTOLIC BLOOD PRESSURE: 135 MMHG | RESPIRATION RATE: 18 BRPM | DIASTOLIC BLOOD PRESSURE: 73 MMHG | TEMPERATURE: 97.7 F

## 2022-01-29 VITALS
HEART RATE: 80 BPM | RESPIRATION RATE: 18 BRPM | TEMPERATURE: 97.8 F | SYSTOLIC BLOOD PRESSURE: 149 MMHG | DIASTOLIC BLOOD PRESSURE: 82 MMHG

## 2022-01-29 VITALS
RESPIRATION RATE: 18 BRPM | SYSTOLIC BLOOD PRESSURE: 137 MMHG | TEMPERATURE: 97.34 F | DIASTOLIC BLOOD PRESSURE: 79 MMHG | HEART RATE: 76 BPM

## 2022-01-29 VITALS
DIASTOLIC BLOOD PRESSURE: 82 MMHG | HEART RATE: 80 BPM | TEMPERATURE: 97.8 F | RESPIRATION RATE: 18 BRPM | SYSTOLIC BLOOD PRESSURE: 149 MMHG

## 2022-01-29 VITALS — OXYGEN SATURATION: 100 % | RESPIRATION RATE: 17 BRPM | HEART RATE: 75 BPM

## 2022-01-29 VITALS
HEART RATE: 75 BPM | OXYGEN SATURATION: 100 % | RESPIRATION RATE: 17 BRPM | TEMPERATURE: 97.7 F | DIASTOLIC BLOOD PRESSURE: 69 MMHG | SYSTOLIC BLOOD PRESSURE: 110 MMHG

## 2022-01-29 VITALS — OXYGEN SATURATION: 98 %

## 2022-01-29 LAB
ADD MANUAL DIFF / SLIDE REVIEW: NO
ADD MANUAL DIFF / SLIDE REVIEW: NO
BUN SERPL-MCNC: 68 MG/DL (ref 9–20)
CALCIUM SERPL-MCNC: 7.5 MG/DL (ref 8.4–10.2)
CHLORIDE SERPL-SCNC: 113 MMOL/L (ref 98–107)
CO2 SERPL-SCNC: 26 MMOL/L (ref 22–32)
ESTIMATED GLOMERULAR FILT RATE: 24.1 ML/MIN (ref 60–?)
GLUCOSE SERPL-MCNC: 115 MG/DL (ref 80–110)
HEMATOCRIT: 20.4 % (ref 41–53)
HEMATOCRIT: 21 % (ref 41–53)
HEMATOCRIT: 24.1 % (ref 41–53)
HEMOGLOBIN: 6.8 G/DL (ref 13.5–17.5)
HEMOGLOBIN: 7 G/DL (ref 13.5–17.5)
HEMOGLOBIN: 8.3 G/DL (ref 13.5–17.5)
HEMOLYSIS: < 15 (ref 0–50)
LYMPHOCYTES # SPEC AUTO: 600 /UL (ref 1100–4500)
LYMPHOCYTES # SPEC AUTO: 800 /UL (ref 1100–4500)
MCV RBC: 91.6 FL (ref 80–100)
MCV RBC: 92.5 FL (ref 80–100)
MEAN CORPUSCULAR HEMOGLOBIN: 30.6 PG (ref 26–34)
MEAN CORPUSCULAR HEMOGLOBIN: 30.7 PG (ref 26–34)
MEAN CORPUSCULAR HGB CONC: 33.1 % (ref 30–36)
MEAN CORPUSCULAR HGB CONC: 33.5 % (ref 30–36)
PLATELET COUNT: 57 X10^3/UL (ref 150–400)
PLATELET COUNT: 59 X10^3/UL (ref 150–400)
POTASSIUM SERPL-SCNC: 4.5 MMOL/L (ref 3.4–5.1)
SODIUM SERPL-SCNC: 139 MMOL/L (ref 137–145)

## 2022-01-29 RX ADMIN — PANTOPRAZOLE SODIUM 40 MG: 40 INJECTION, POWDER, FOR SOLUTION INTRAVENOUS at 21:29

## 2022-01-29 RX ADMIN — PANTOPRAZOLE SODIUM 40 MG: 40 INJECTION, POWDER, FOR SOLUTION INTRAVENOUS at 08:59

## 2022-01-29 RX ADMIN — ATORVASTATIN CALCIUM 40 MG: 20 TABLET, FILM COATED ORAL at 08:59

## 2022-01-30 VITALS
TEMPERATURE: 97.3 F | DIASTOLIC BLOOD PRESSURE: 77 MMHG | HEART RATE: 80 BPM | SYSTOLIC BLOOD PRESSURE: 143 MMHG | RESPIRATION RATE: 16 BRPM

## 2022-01-30 VITALS
DIASTOLIC BLOOD PRESSURE: 72 MMHG | HEART RATE: 79 BPM | RESPIRATION RATE: 17 BRPM | TEMPERATURE: 97.7 F | SYSTOLIC BLOOD PRESSURE: 136 MMHG

## 2022-01-30 VITALS
SYSTOLIC BLOOD PRESSURE: 138 MMHG | RESPIRATION RATE: 17 BRPM | HEART RATE: 82 BPM | DIASTOLIC BLOOD PRESSURE: 80 MMHG | OXYGEN SATURATION: 100 % | TEMPERATURE: 98.24 F

## 2022-01-30 VITALS
RESPIRATION RATE: 16 BRPM | HEART RATE: 83 BPM | TEMPERATURE: 98.3 F | DIASTOLIC BLOOD PRESSURE: 75 MMHG | OXYGEN SATURATION: 99 % | SYSTOLIC BLOOD PRESSURE: 126 MMHG

## 2022-01-30 VITALS
RESPIRATION RATE: 15 BRPM | OXYGEN SATURATION: 100 % | DIASTOLIC BLOOD PRESSURE: 73 MMHG | TEMPERATURE: 96.9 F | SYSTOLIC BLOOD PRESSURE: 114 MMHG | HEART RATE: 63 BPM

## 2022-01-30 VITALS
OXYGEN SATURATION: 98 % | HEART RATE: 79 BPM | SYSTOLIC BLOOD PRESSURE: 140 MMHG | RESPIRATION RATE: 19 BRPM | TEMPERATURE: 98.6 F | DIASTOLIC BLOOD PRESSURE: 79 MMHG

## 2022-01-30 VITALS
SYSTOLIC BLOOD PRESSURE: 147 MMHG | HEART RATE: 77 BPM | RESPIRATION RATE: 16 BRPM | DIASTOLIC BLOOD PRESSURE: 75 MMHG | TEMPERATURE: 97.52 F

## 2022-01-30 VITALS
DIASTOLIC BLOOD PRESSURE: 74 MMHG | HEART RATE: 76 BPM | SYSTOLIC BLOOD PRESSURE: 138 MMHG | TEMPERATURE: 97.7 F | RESPIRATION RATE: 16 BRPM

## 2022-01-30 VITALS
DIASTOLIC BLOOD PRESSURE: 75 MMHG | HEART RATE: 78 BPM | RESPIRATION RATE: 17 BRPM | SYSTOLIC BLOOD PRESSURE: 126 MMHG | TEMPERATURE: 98.3 F

## 2022-01-30 VITALS
DIASTOLIC BLOOD PRESSURE: 75 MMHG | TEMPERATURE: 98.3 F | SYSTOLIC BLOOD PRESSURE: 126 MMHG | HEART RATE: 78 BPM | RESPIRATION RATE: 17 BRPM

## 2022-01-30 VITALS
SYSTOLIC BLOOD PRESSURE: 147 MMHG | TEMPERATURE: 97.52 F | RESPIRATION RATE: 16 BRPM | DIASTOLIC BLOOD PRESSURE: 75 MMHG | HEART RATE: 77 BPM

## 2022-01-30 VITALS
DIASTOLIC BLOOD PRESSURE: 76 MMHG | SYSTOLIC BLOOD PRESSURE: 155 MMHG | OXYGEN SATURATION: 100 % | TEMPERATURE: 97.9 F | HEART RATE: 77 BPM | RESPIRATION RATE: 18 BRPM

## 2022-01-30 VITALS — OXYGEN SATURATION: 94 %

## 2022-01-30 VITALS — OXYGEN SATURATION: 98 % | HEART RATE: 82 BPM | RESPIRATION RATE: 16 BRPM

## 2022-01-30 VITALS — OXYGEN SATURATION: 98 %

## 2022-01-30 LAB
ADD MANUAL DIFF / SLIDE REVIEW: NO
HEMATOCRIT: 23.3 % (ref 41–53)
HEMOGLOBIN: 7.9 G/DL (ref 13.5–17.5)
LYMPHOCYTES # SPEC AUTO: 700 /UL (ref 1100–4500)
MCV RBC: 91.3 FL (ref 80–100)
MEAN CORPUSCULAR HEMOGLOBIN: 31 PG (ref 26–34)
MEAN CORPUSCULAR HGB CONC: 33.9 % (ref 30–36)
PLATELET COUNT: 51 X10^3/UL (ref 150–400)

## 2022-01-30 RX ADMIN — ATORVASTATIN CALCIUM 40 MG: 20 TABLET, FILM COATED ORAL at 09:45

## 2022-01-30 RX ADMIN — POLYETHYLENE GLYCOL 3350, SODIUM SULFATE ANHYDROUS, SODIUM BICARBONATE, SODIUM CHLORIDE, POTASSIUM CHLORIDE 4000 ML: 236; 22.74; 6.74; 5.86; 2.97 POWDER, FOR SOLUTION ORAL at 11:22

## 2022-01-30 RX ADMIN — PANTOPRAZOLE SODIUM 40 MG: 40 INJECTION, POWDER, FOR SOLUTION INTRAVENOUS at 09:45

## 2022-01-30 RX ADMIN — Medication 10 ML: at 21:38

## 2022-01-30 RX ADMIN — BISACODYL 5 MG: 5 TABLET, COATED ORAL at 11:22

## 2022-01-30 RX ADMIN — CHOLECALCIFEROL TAB 25 MCG (1000 UNIT) 1000 UNIT: 25 TAB at 09:45

## 2022-01-30 RX ADMIN — PANTOPRAZOLE SODIUM 40 MG: 40 INJECTION, POWDER, FOR SOLUTION INTRAVENOUS at 21:38

## 2022-01-31 VITALS
OXYGEN SATURATION: 98 % | HEART RATE: 74 BPM | DIASTOLIC BLOOD PRESSURE: 71 MMHG | SYSTOLIC BLOOD PRESSURE: 146 MMHG | RESPIRATION RATE: 16 BRPM | TEMPERATURE: 97.5 F

## 2022-01-31 VITALS
SYSTOLIC BLOOD PRESSURE: 151 MMHG | RESPIRATION RATE: 16 BRPM | DIASTOLIC BLOOD PRESSURE: 73 MMHG | HEART RATE: 78 BPM | OXYGEN SATURATION: 95 %

## 2022-01-31 VITALS
SYSTOLIC BLOOD PRESSURE: 146 MMHG | DIASTOLIC BLOOD PRESSURE: 71 MMHG | HEART RATE: 74 BPM | TEMPERATURE: 97.52 F | RESPIRATION RATE: 16 BRPM

## 2022-01-31 VITALS
TEMPERATURE: 97.6 F | HEART RATE: 79 BPM | OXYGEN SATURATION: 99 % | RESPIRATION RATE: 14 BRPM | DIASTOLIC BLOOD PRESSURE: 78 MMHG | SYSTOLIC BLOOD PRESSURE: 156 MMHG

## 2022-01-31 VITALS
HEART RATE: 71 BPM | SYSTOLIC BLOOD PRESSURE: 141 MMHG | TEMPERATURE: 97.52 F | RESPIRATION RATE: 16 BRPM | DIASTOLIC BLOOD PRESSURE: 82 MMHG

## 2022-01-31 VITALS
HEART RATE: 71 BPM | OXYGEN SATURATION: 97 % | SYSTOLIC BLOOD PRESSURE: 147 MMHG | DIASTOLIC BLOOD PRESSURE: 73 MMHG | RESPIRATION RATE: 14 BRPM | TEMPERATURE: 98.78 F

## 2022-01-31 VITALS — OXYGEN SATURATION: 93 %

## 2022-01-31 VITALS
TEMPERATURE: 98.24 F | SYSTOLIC BLOOD PRESSURE: 162 MMHG | OXYGEN SATURATION: 99 % | HEART RATE: 74 BPM | RESPIRATION RATE: 16 BRPM | DIASTOLIC BLOOD PRESSURE: 75 MMHG

## 2022-01-31 VITALS
OXYGEN SATURATION: 99 % | SYSTOLIC BLOOD PRESSURE: 141 MMHG | DIASTOLIC BLOOD PRESSURE: 82 MMHG | TEMPERATURE: 97.52 F | HEART RATE: 71 BPM | RESPIRATION RATE: 16 BRPM

## 2022-01-31 VITALS — OXYGEN SATURATION: 97 %

## 2022-01-31 VITALS
TEMPERATURE: 99.1 F | RESPIRATION RATE: 18 BRPM | DIASTOLIC BLOOD PRESSURE: 81 MMHG | SYSTOLIC BLOOD PRESSURE: 154 MMHG | HEART RATE: 86 BPM | OXYGEN SATURATION: 93 %

## 2022-01-31 VITALS
HEART RATE: 77 BPM | SYSTOLIC BLOOD PRESSURE: 151 MMHG | TEMPERATURE: 97.88 F | RESPIRATION RATE: 16 BRPM | OXYGEN SATURATION: 97 % | DIASTOLIC BLOOD PRESSURE: 71 MMHG

## 2022-01-31 VITALS
TEMPERATURE: 97.34 F | DIASTOLIC BLOOD PRESSURE: 73 MMHG | SYSTOLIC BLOOD PRESSURE: 144 MMHG | HEART RATE: 74 BPM | RESPIRATION RATE: 16 BRPM

## 2022-01-31 VITALS
RESPIRATION RATE: 16 BRPM | SYSTOLIC BLOOD PRESSURE: 134 MMHG | TEMPERATURE: 97.6 F | DIASTOLIC BLOOD PRESSURE: 76 MMHG | HEART RATE: 78 BPM

## 2022-01-31 VITALS — HEART RATE: 73 BPM | DIASTOLIC BLOOD PRESSURE: 72 MMHG | SYSTOLIC BLOOD PRESSURE: 135 MMHG

## 2022-01-31 VITALS — OXYGEN SATURATION: 96 %

## 2022-01-31 VITALS
HEART RATE: 80 BPM | RESPIRATION RATE: 19 BRPM | DIASTOLIC BLOOD PRESSURE: 75 MMHG | TEMPERATURE: 98 F | SYSTOLIC BLOOD PRESSURE: 122 MMHG | OXYGEN SATURATION: 94 %

## 2022-01-31 VITALS
HEART RATE: 80 BPM | OXYGEN SATURATION: 94 % | SYSTOLIC BLOOD PRESSURE: 134 MMHG | DIASTOLIC BLOOD PRESSURE: 74 MMHG | RESPIRATION RATE: 16 BRPM

## 2022-01-31 VITALS
DIASTOLIC BLOOD PRESSURE: 75 MMHG | HEART RATE: 74 BPM | RESPIRATION RATE: 16 BRPM | TEMPERATURE: 98.24 F | SYSTOLIC BLOOD PRESSURE: 162 MMHG

## 2022-01-31 VITALS
HEART RATE: 78 BPM | SYSTOLIC BLOOD PRESSURE: 138 MMHG | DIASTOLIC BLOOD PRESSURE: 66 MMHG | OXYGEN SATURATION: 100 % | RESPIRATION RATE: 16 BRPM

## 2022-01-31 VITALS
SYSTOLIC BLOOD PRESSURE: 151 MMHG | DIASTOLIC BLOOD PRESSURE: 71 MMHG | RESPIRATION RATE: 16 BRPM | TEMPERATURE: 97.88 F | HEART RATE: 77 BPM

## 2022-01-31 VITALS
HEART RATE: 84 BPM | SYSTOLIC BLOOD PRESSURE: 132 MMHG | DIASTOLIC BLOOD PRESSURE: 75 MMHG | RESPIRATION RATE: 16 BRPM | OXYGEN SATURATION: 98 %

## 2022-01-31 VITALS
RESPIRATION RATE: 18 BRPM | SYSTOLIC BLOOD PRESSURE: 141 MMHG | TEMPERATURE: 97.9 F | OXYGEN SATURATION: 98 % | HEART RATE: 76 BPM | DIASTOLIC BLOOD PRESSURE: 76 MMHG

## 2022-01-31 VITALS — OXYGEN SATURATION: 99 %

## 2022-01-31 LAB
ADD MANUAL DIFF / SLIDE REVIEW: NO
HEMATOCRIT: 21.1 % (ref 41–53)
HEMOGLOBIN: 7.1 G/DL (ref 13.5–17.5)
LYMPHOCYTES # SPEC AUTO: 600 /UL (ref 1100–4500)
MCV RBC: 92.7 FL (ref 80–100)
MEAN CORPUSCULAR HEMOGLOBIN: 31 PG (ref 26–34)
MEAN CORPUSCULAR HGB CONC: 33.5 % (ref 30–36)
PLATELET COUNT: 82 X10^3/UL (ref 150–400)

## 2022-01-31 PROCEDURE — 0DJ08ZZ INSPECTION OF UPPER INTESTINAL TRACT, VIA NATURAL OR ARTIFICIAL OPENING ENDOSCOPIC: ICD-10-PCS | Performed by: SURGERY

## 2022-01-31 RX ADMIN — Medication 10 ML: at 21:01

## 2022-01-31 RX ADMIN — SODIUM CHLORIDE, SODIUM LACTATE, POTASSIUM CHLORIDE, AND CALCIUM CHLORIDE 42 ML: .6; .31; .03; .02 INJECTION, SOLUTION INTRAVENOUS at 11:12

## 2022-01-31 RX ADMIN — PANTOPRAZOLE SODIUM 40 MG: 40 INJECTION, POWDER, FOR SOLUTION INTRAVENOUS at 21:01

## 2022-01-31 RX ADMIN — Medication 10 ML: at 08:06

## 2022-01-31 RX ADMIN — PANTOPRAZOLE SODIUM 40 MG: 40 INJECTION, POWDER, FOR SOLUTION INTRAVENOUS at 08:01

## 2022-02-01 VITALS
SYSTOLIC BLOOD PRESSURE: 144 MMHG | OXYGEN SATURATION: 100 % | TEMPERATURE: 97.52 F | RESPIRATION RATE: 16 BRPM | HEART RATE: 69 BPM | DIASTOLIC BLOOD PRESSURE: 75 MMHG

## 2022-02-01 VITALS
OXYGEN SATURATION: 100 % | RESPIRATION RATE: 16 BRPM | DIASTOLIC BLOOD PRESSURE: 76 MMHG | HEART RATE: 76 BPM | SYSTOLIC BLOOD PRESSURE: 136 MMHG | TEMPERATURE: 97.5 F

## 2022-02-01 VITALS — OXYGEN SATURATION: 97 %

## 2022-02-01 VITALS
DIASTOLIC BLOOD PRESSURE: 76 MMHG | TEMPERATURE: 99.86 F | HEART RATE: 84 BPM | SYSTOLIC BLOOD PRESSURE: 153 MMHG | OXYGEN SATURATION: 95 % | RESPIRATION RATE: 18 BRPM

## 2022-02-01 VITALS
DIASTOLIC BLOOD PRESSURE: 82 MMHG | TEMPERATURE: 97.8 F | HEART RATE: 71 BPM | RESPIRATION RATE: 18 BRPM | OXYGEN SATURATION: 99 % | SYSTOLIC BLOOD PRESSURE: 156 MMHG

## 2022-02-01 VITALS
SYSTOLIC BLOOD PRESSURE: 142 MMHG | TEMPERATURE: 97.88 F | RESPIRATION RATE: 18 BRPM | HEART RATE: 68 BPM | DIASTOLIC BLOOD PRESSURE: 76 MMHG | OXYGEN SATURATION: 94 %

## 2022-02-01 VITALS
RESPIRATION RATE: 18 BRPM | SYSTOLIC BLOOD PRESSURE: 137 MMHG | HEART RATE: 73 BPM | DIASTOLIC BLOOD PRESSURE: 72 MMHG | OXYGEN SATURATION: 97 % | TEMPERATURE: 98.6 F

## 2022-02-01 VITALS
OXYGEN SATURATION: 96 % | RESPIRATION RATE: 17 BRPM | HEART RATE: 73 BPM | DIASTOLIC BLOOD PRESSURE: 73 MMHG | SYSTOLIC BLOOD PRESSURE: 143 MMHG | TEMPERATURE: 97.88 F

## 2022-02-01 VITALS — OXYGEN SATURATION: 99 %

## 2022-02-01 VITALS — OXYGEN SATURATION: 93 %

## 2022-02-01 LAB
ADD MANUAL DIFF / SLIDE REVIEW: NO
BUN SERPL-MCNC: 44 MG/DL (ref 9–20)
CALCIUM SERPL-MCNC: 8.4 MG/DL (ref 8.4–10.2)
CHLORIDE SERPL-SCNC: 114 MMOL/L (ref 98–107)
CO2 SERPL-SCNC: 24 MMOL/L (ref 22–32)
ESTIMATED GLOMERULAR FILT RATE: 24.1 ML/MIN (ref 60–?)
GLUCOSE SERPL-MCNC: 96 MG/DL (ref 80–110)
HEMATOCRIT: 30.8 % (ref 41–53)
HEMOGLOBIN: 10.4 G/DL (ref 13.5–17.5)
HEMOLYSIS: < 15 (ref 0–50)
LYMPHOCYTES # SPEC AUTO: 1200 /UL (ref 1100–4500)
MCV RBC: 89.6 FL (ref 80–100)
MEAN CORPUSCULAR HEMOGLOBIN: 30.4 PG (ref 26–34)
MEAN CORPUSCULAR HGB CONC: 33.9 % (ref 30–36)
PLATELET COUNT: 85 X10^3/UL (ref 150–400)
POTASSIUM SERPL-SCNC: 4.1 MMOL/L (ref 3.4–5.1)
SODIUM SERPL-SCNC: 142 MMOL/L (ref 137–145)

## 2022-02-01 RX ADMIN — SUCRALFATE 1 GM: 1 SUSPENSION ORAL at 12:46

## 2022-02-01 RX ADMIN — Medication 10 ML: at 20:50

## 2022-02-01 RX ADMIN — SUCRALFATE 1 GM: 1 SUSPENSION ORAL at 17:55

## 2022-02-01 RX ADMIN — SUCRALFATE 1 GM: 1 SUSPENSION ORAL at 20:50

## 2022-02-01 RX ADMIN — CHOLECALCIFEROL TAB 25 MCG (1000 UNIT) 1000 UNIT: 25 TAB at 09:48

## 2022-02-01 RX ADMIN — PANTOPRAZOLE SODIUM 40 MG: 40 INJECTION, POWDER, FOR SOLUTION INTRAVENOUS at 20:50

## 2022-02-01 RX ADMIN — ATORVASTATIN CALCIUM 40 MG: 20 TABLET, FILM COATED ORAL at 09:48

## 2022-02-01 RX ADMIN — PANTOPRAZOLE SODIUM 40 MG: 40 INJECTION, POWDER, FOR SOLUTION INTRAVENOUS at 09:48

## 2022-02-01 RX ADMIN — SUCRALFATE 1 GM: 1 SUSPENSION ORAL at 09:48

## 2022-02-01 RX ADMIN — Medication 10 ML: at 09:49

## 2022-02-02 VITALS — OXYGEN SATURATION: 98 %

## 2022-02-02 VITALS
RESPIRATION RATE: 18 BRPM | TEMPERATURE: 98.06 F | HEART RATE: 87 BPM | OXYGEN SATURATION: 98 % | SYSTOLIC BLOOD PRESSURE: 164 MMHG | DIASTOLIC BLOOD PRESSURE: 88 MMHG

## 2022-02-02 VITALS
SYSTOLIC BLOOD PRESSURE: 138 MMHG | DIASTOLIC BLOOD PRESSURE: 69 MMHG | RESPIRATION RATE: 18 BRPM | HEART RATE: 75 BPM | TEMPERATURE: 98.06 F | OXYGEN SATURATION: 96 %

## 2022-02-02 VITALS
RESPIRATION RATE: 18 BRPM | TEMPERATURE: 98.24 F | HEART RATE: 78 BPM | DIASTOLIC BLOOD PRESSURE: 78 MMHG | SYSTOLIC BLOOD PRESSURE: 141 MMHG | OXYGEN SATURATION: 97 %

## 2022-02-02 VITALS — OXYGEN SATURATION: 99 %

## 2022-02-02 LAB
HEMATOCRIT: 27.2 % (ref 41–53)
HEMOGLOBIN: 9.2 G/DL (ref 13.5–17.5)

## 2022-02-02 RX ADMIN — CHOLECALCIFEROL TAB 25 MCG (1000 UNIT) 1000 UNIT: 25 TAB at 08:21

## 2022-02-02 RX ADMIN — ATORVASTATIN CALCIUM 40 MG: 20 TABLET, FILM COATED ORAL at 08:21

## 2022-02-02 RX ADMIN — PANTOPRAZOLE SODIUM 40 MG: 40 INJECTION, POWDER, FOR SOLUTION INTRAVENOUS at 08:22

## 2022-02-02 RX ADMIN — Medication 10 ML: at 08:22

## 2022-02-02 RX ADMIN — SUCRALFATE 1 GM: 1 SUSPENSION ORAL at 08:20

## 2022-02-10 ENCOUNTER — HOSPITAL ENCOUNTER (OUTPATIENT)
Age: 82
End: 2022-02-10
Payer: MEDICARE

## 2022-02-10 VITALS
DIASTOLIC BLOOD PRESSURE: 70 MMHG | OXYGEN SATURATION: 98 % | RESPIRATION RATE: 18 BRPM | TEMPERATURE: 97.8 F | SYSTOLIC BLOOD PRESSURE: 139 MMHG | HEART RATE: 65 BPM

## 2022-02-10 VITALS — BODY MASS INDEX: 25 KG/M2

## 2022-02-10 DIAGNOSIS — I50.31: ICD-10-CM

## 2022-02-10 DIAGNOSIS — D63.1: ICD-10-CM

## 2022-02-10 DIAGNOSIS — N18.4: ICD-10-CM

## 2022-02-10 DIAGNOSIS — D46.9: Primary | ICD-10-CM

## 2022-02-10 DIAGNOSIS — I13.0: ICD-10-CM

## 2022-02-10 PROCEDURE — 96372 THER/PROPH/DIAG INJ SC/IM: CPT

## 2022-02-15 ENCOUNTER — HOSPITAL ENCOUNTER (OUTPATIENT)
Age: 82
End: 2022-02-15
Payer: MEDICARE

## 2022-02-15 VITALS — BODY MASS INDEX: 25 KG/M2

## 2022-02-15 DIAGNOSIS — N18.4: Primary | ICD-10-CM

## 2022-02-15 LAB
25(OH)D3+25(OH)D2 SERPL-MCNC: 33.4 NG/ML (ref 30–100)
ADD MANUAL DIFF / SLIDE REVIEW: YES
ANISOCYTOSIS BLD QL: (no result)
BAND NEUTROPHILS PERCENT: 2 % (ref 3–7)
BASOPHILS NFR SPEC MANUAL: 3 % (ref 0–1)
BUN SERPL-MCNC: 39 MG/DL (ref 9–20)
CALCIUM SERPL-MCNC: 8.7 MG/DL (ref 8.4–10.2)
CHLORIDE SERPL-SCNC: 107 MMOL/L (ref 98–107)
CO2 SERPL-SCNC: 27 MMOL/L (ref 22–32)
EOSINOPHILS PERCENT MANUAL: 1 % (ref 2–4)
ESTIMATED GLOMERULAR FILT RATE: 29.3 ML/MIN (ref 60–?)
GLUCOSE SERPL-MCNC: 82 MG/DL (ref 80–110)
HEMATOCRIT: 26.8 % (ref 41–53)
HEMOGLOBIN: 9 G/DL (ref 13.5–17.5)
HEMOLYSIS: < 15 (ref 0–50)
LYMPHOCYTES PERCENT MANUAL: 42 % (ref 25–45)
MAGNESIUM SERPL-MCNC: 2.1 MG/DL (ref 1.6–2.3)
MCV RBC: 92.7 FL (ref 80–100)
MEAN CORPUSCULAR HEMOGLOBIN: 31 PG (ref 26–34)
MEAN CORPUSCULAR HGB CONC: 33.5 % (ref 30–36)
MONOCYTES PERCENT MANUAL: 13 % (ref 2–11)
NEUTROPHILS ABSOLUTE MANUAL: 902 /UL (ref 3000–5900)
NEUTS SEG NFR BLD: 39 % (ref 38–70)
PHOSPHATE SERPL-MCNC: 3.3 MG/DL (ref 2.3–3.7)
PLATELET COUNT: 66 X10^3/UL (ref 150–400)
POLYCHROMASIA BLD QL SMEAR: (no result)
POTASSIUM SERPL-SCNC: 5.2 MMOL/L (ref 3.4–5.1)
SODIUM SERPL-SCNC: 137 MMOL/L (ref 137–145)
TOTAL CELLS COUNTED: 100
URATE SERPL-MCNC: 4.1 MG/DL (ref 3.5–8.5)

## 2022-02-15 PROCEDURE — 84550 ASSAY OF BLOOD/URIC ACID: CPT

## 2022-02-15 PROCEDURE — 85025 COMPLETE CBC W/AUTO DIFF WBC: CPT

## 2022-02-15 PROCEDURE — 83970 ASSAY OF PARATHORMONE: CPT

## 2022-02-15 PROCEDURE — 84100 ASSAY OF PHOSPHORUS: CPT

## 2022-02-15 PROCEDURE — 84156 ASSAY OF PROTEIN URINE: CPT

## 2022-02-15 PROCEDURE — 83735 ASSAY OF MAGNESIUM: CPT

## 2022-02-15 PROCEDURE — 82570 ASSAY OF URINE CREATININE: CPT

## 2022-02-15 PROCEDURE — 80048 BASIC METABOLIC PNL TOTAL CA: CPT

## 2022-02-15 PROCEDURE — 36415 COLL VENOUS BLD VENIPUNCTURE: CPT

## 2022-02-15 PROCEDURE — 82306 VITAMIN D 25 HYDROXY: CPT

## 2022-02-15 PROCEDURE — 85007 BL SMEAR W/DIFF WBC COUNT: CPT

## 2022-02-24 ENCOUNTER — HOSPITAL ENCOUNTER (OUTPATIENT)
Age: 82
End: 2022-02-24
Payer: MEDICARE

## 2022-02-24 VITALS — BODY MASS INDEX: 25 KG/M2

## 2022-02-24 DIAGNOSIS — I50.31: ICD-10-CM

## 2022-02-24 DIAGNOSIS — I13.0: ICD-10-CM

## 2022-02-24 DIAGNOSIS — D46.9: Primary | ICD-10-CM

## 2022-02-24 DIAGNOSIS — D63.1: ICD-10-CM

## 2022-02-24 DIAGNOSIS — N18.4: ICD-10-CM

## 2022-02-24 DIAGNOSIS — I34.0: ICD-10-CM

## 2022-02-24 PROCEDURE — 96372 THER/PROPH/DIAG INJ SC/IM: CPT

## 2022-02-24 PROCEDURE — 99214 OFFICE O/P EST MOD 30 MIN: CPT

## 2022-03-10 ENCOUNTER — HOSPITAL ENCOUNTER (OUTPATIENT)
Age: 82
End: 2022-03-10
Payer: MEDICARE

## 2022-03-10 VITALS
SYSTOLIC BLOOD PRESSURE: 138 MMHG | DIASTOLIC BLOOD PRESSURE: 61 MMHG | HEART RATE: 59 BPM | TEMPERATURE: 97.9 F | OXYGEN SATURATION: 100 % | RESPIRATION RATE: 16 BRPM

## 2022-03-10 VITALS — BODY MASS INDEX: 25 KG/M2

## 2022-03-10 DIAGNOSIS — I13.0: ICD-10-CM

## 2022-03-10 DIAGNOSIS — D63.1: ICD-10-CM

## 2022-03-10 DIAGNOSIS — N18.4: ICD-10-CM

## 2022-03-10 DIAGNOSIS — I50.31: ICD-10-CM

## 2022-03-10 DIAGNOSIS — D46.9: Primary | ICD-10-CM

## 2022-03-10 PROCEDURE — 96372 THER/PROPH/DIAG INJ SC/IM: CPT

## 2022-03-24 ENCOUNTER — HOSPITAL ENCOUNTER (OUTPATIENT)
Age: 82
End: 2022-03-24
Payer: MEDICARE

## 2022-03-24 VITALS — BODY MASS INDEX: 25 KG/M2

## 2022-03-24 DIAGNOSIS — I12.9: Primary | ICD-10-CM

## 2022-03-24 DIAGNOSIS — N18.4: ICD-10-CM

## 2022-03-24 DIAGNOSIS — D63.1: ICD-10-CM

## 2022-04-07 ENCOUNTER — HOSPITAL ENCOUNTER (OUTPATIENT)
Age: 82
End: 2022-04-07
Payer: MEDICARE

## 2022-04-07 VITALS — BODY MASS INDEX: 25 KG/M2

## 2022-04-07 DIAGNOSIS — D46.9: Primary | ICD-10-CM

## 2022-04-07 DIAGNOSIS — E78.5: ICD-10-CM

## 2022-04-07 DIAGNOSIS — I50.31: ICD-10-CM

## 2022-04-07 DIAGNOSIS — D63.1: ICD-10-CM

## 2022-04-07 DIAGNOSIS — I13.0: ICD-10-CM

## 2022-04-07 DIAGNOSIS — I34.0: ICD-10-CM

## 2022-04-07 DIAGNOSIS — L29.9: ICD-10-CM

## 2022-04-07 DIAGNOSIS — N18.4: ICD-10-CM

## 2022-04-07 PROCEDURE — 99214 OFFICE O/P EST MOD 30 MIN: CPT

## 2022-04-07 PROCEDURE — 96372 THER/PROPH/DIAG INJ SC/IM: CPT

## 2022-04-21 ENCOUNTER — HOSPITAL ENCOUNTER (OUTPATIENT)
Age: 82
End: 2022-04-21
Payer: MEDICARE

## 2022-04-21 VITALS — BODY MASS INDEX: 25 KG/M2

## 2022-04-21 VITALS
HEART RATE: 61 BPM | DIASTOLIC BLOOD PRESSURE: 61 MMHG | SYSTOLIC BLOOD PRESSURE: 121 MMHG | OXYGEN SATURATION: 99 % | RESPIRATION RATE: 16 BRPM | TEMPERATURE: 96.62 F

## 2022-04-21 DIAGNOSIS — D46.9: ICD-10-CM

## 2022-04-21 DIAGNOSIS — D63.1: ICD-10-CM

## 2022-04-21 DIAGNOSIS — I12.9: Primary | ICD-10-CM

## 2022-04-21 DIAGNOSIS — N18.4: ICD-10-CM

## 2022-04-21 PROCEDURE — 96372 THER/PROPH/DIAG INJ SC/IM: CPT

## 2022-05-03 ENCOUNTER — HOSPITAL ENCOUNTER (OUTPATIENT)
Age: 82
End: 2022-05-03
Payer: MEDICARE

## 2022-05-03 VITALS — BODY MASS INDEX: 25 KG/M2

## 2022-05-03 DIAGNOSIS — N18.32: Primary | ICD-10-CM

## 2022-05-03 LAB
25(OH)D3+25(OH)D2 SERPL-MCNC: 72.7 NG/ML (ref 30–100)
APPEARANCE UR: CLEAR
BILIRUBIN URINE UA: NEGATIVE
BUN SERPL-MCNC: 76 MG/DL (ref 9–20)
CALCIUM SERPL-MCNC: 8.3 MG/DL (ref 8.4–10.2)
CHLORIDE SERPL-SCNC: 105 MMOL/L (ref 98–107)
CO2 SERPL-SCNC: 28 MMOL/L (ref 22–32)
COLOR UR: YELLOW
ESTIMATED GLOMERULAR FILT RATE: 19 ML/MIN (ref 60–?)
GLUCOSE SERPL-MCNC: 63 MG/DL (ref 80–110)
GLUCOSE URINE UA: NEGATIVE G/DL
HEMOLYSIS: < 15 (ref 0–50)
HGB UR QL: NEGATIVE
KETONES URINE UA: NEGATIVE
LEUKOCYTE ESTERASE URINE UA: NEGATIVE
MAGNESIUM SERPL-MCNC: 2.5 MG/DL (ref 1.6–2.3)
MICROALBUMI CREATININ RATIO UR: 325.9 UG/MG CR (ref ?–30)
NITRITE URINE UA: NEGATIVE
PH UR: 5 [PH] (ref 4.5–8)
PHOSPHATE SERPL-MCNC: 5.1 MG/DL (ref 2.3–3.7)
POTASSIUM SERPL-SCNC: 4.7 MMOL/L (ref 3.4–5.1)
PROTEIN URINE UA: (no result)
SODIUM SERPL-SCNC: 138 MMOL/L (ref 137–145)
SP GR UR: 1.01 (ref 1–1.03)
URATE SERPL-MCNC: 7.6 MG/DL (ref 3.5–8.5)
UROBILINOGEN UR QL: 0.2 E.U./DL

## 2022-05-03 PROCEDURE — 84550 ASSAY OF BLOOD/URIC ACID: CPT

## 2022-05-03 PROCEDURE — 36415 COLL VENOUS BLD VENIPUNCTURE: CPT

## 2022-05-03 PROCEDURE — 84100 ASSAY OF PHOSPHORUS: CPT

## 2022-05-03 PROCEDURE — 82570 ASSAY OF URINE CREATININE: CPT

## 2022-05-03 PROCEDURE — 83735 ASSAY OF MAGNESIUM: CPT

## 2022-05-03 PROCEDURE — 82043 UR ALBUMIN QUANTITATIVE: CPT

## 2022-05-03 PROCEDURE — 83970 ASSAY OF PARATHORMONE: CPT

## 2022-05-03 PROCEDURE — 82306 VITAMIN D 25 HYDROXY: CPT

## 2022-05-03 PROCEDURE — 80048 BASIC METABOLIC PNL TOTAL CA: CPT

## 2022-05-03 PROCEDURE — 81001 URINALYSIS AUTO W/SCOPE: CPT

## 2022-05-05 ENCOUNTER — HOSPITAL ENCOUNTER (OUTPATIENT)
Age: 82
End: 2022-05-05
Payer: MEDICARE

## 2022-05-05 VITALS
SYSTOLIC BLOOD PRESSURE: 159 MMHG | TEMPERATURE: 97.2 F | DIASTOLIC BLOOD PRESSURE: 66 MMHG | RESPIRATION RATE: 16 BRPM | HEART RATE: 55 BPM | OXYGEN SATURATION: 100 %

## 2022-05-05 VITALS — BODY MASS INDEX: 25 KG/M2

## 2022-05-05 VITALS — DIASTOLIC BLOOD PRESSURE: 70 MMHG | SYSTOLIC BLOOD PRESSURE: 150 MMHG

## 2022-05-05 DIAGNOSIS — I13.0: ICD-10-CM

## 2022-05-05 DIAGNOSIS — N18.4: ICD-10-CM

## 2022-05-05 DIAGNOSIS — D63.1: ICD-10-CM

## 2022-05-05 DIAGNOSIS — D46.9: Primary | ICD-10-CM

## 2022-05-05 DIAGNOSIS — I50.31: ICD-10-CM

## 2022-05-05 PROCEDURE — 96372 THER/PROPH/DIAG INJ SC/IM: CPT

## 2022-05-17 ENCOUNTER — HOSPITAL ENCOUNTER (OUTPATIENT)
Age: 82
End: 2022-05-17
Payer: MEDICARE

## 2022-05-17 VITALS — BODY MASS INDEX: 25 KG/M2

## 2022-05-17 DIAGNOSIS — M79.621: ICD-10-CM

## 2022-05-17 DIAGNOSIS — S40.021A: Primary | ICD-10-CM

## 2022-05-17 DIAGNOSIS — M79.89: ICD-10-CM

## 2022-05-17 PROCEDURE — 93971 EXTREMITY STUDY: CPT

## 2022-05-18 ENCOUNTER — HOSPITAL ENCOUNTER (OUTPATIENT)
Age: 82
End: 2022-05-18
Payer: MEDICARE

## 2022-05-18 VITALS — BODY MASS INDEX: 25 KG/M2

## 2022-05-18 DIAGNOSIS — I50.21: Primary | ICD-10-CM

## 2022-05-18 LAB
BUN SERPL-MCNC: 75 MG/DL (ref 9–20)
CALCIUM SERPL-MCNC: 8.1 MG/DL (ref 8.4–10.2)
CHLORIDE SERPL-SCNC: 104 MMOL/L (ref 98–107)
CO2 SERPL-SCNC: 30 MMOL/L (ref 22–32)
ESTIMATED GLOMERULAR FILT RATE: 21 ML/MIN (ref 60–?)
GLUCOSE SERPL-MCNC: 62 MG/DL (ref 80–110)
HEMOLYSIS: < 15 (ref 0–50)
POTASSIUM SERPL-SCNC: 4.2 MMOL/L (ref 3.4–5.1)
SODIUM SERPL-SCNC: 137 MMOL/L (ref 137–145)

## 2022-05-18 PROCEDURE — 80048 BASIC METABOLIC PNL TOTAL CA: CPT

## 2022-05-18 PROCEDURE — 36415 COLL VENOUS BLD VENIPUNCTURE: CPT

## 2022-05-19 ENCOUNTER — HOSPITAL ENCOUNTER (OUTPATIENT)
Age: 82
End: 2022-05-19
Payer: MEDICARE

## 2022-05-19 VITALS — BODY MASS INDEX: 25 KG/M2

## 2022-05-19 DIAGNOSIS — N18.4: ICD-10-CM

## 2022-05-19 DIAGNOSIS — D46.9: Primary | ICD-10-CM

## 2022-05-19 DIAGNOSIS — I50.31: ICD-10-CM

## 2022-05-19 DIAGNOSIS — I13.0: ICD-10-CM

## 2022-05-19 DIAGNOSIS — D63.1: ICD-10-CM

## 2022-05-19 PROCEDURE — 99214 OFFICE O/P EST MOD 30 MIN: CPT

## 2022-05-19 PROCEDURE — 96372 THER/PROPH/DIAG INJ SC/IM: CPT

## 2022-06-02 ENCOUNTER — HOSPITAL ENCOUNTER (OUTPATIENT)
Age: 82
End: 2022-06-02
Payer: MEDICARE

## 2022-06-02 VITALS
OXYGEN SATURATION: 99 % | RESPIRATION RATE: 16 BRPM | HEART RATE: 61 BPM | SYSTOLIC BLOOD PRESSURE: 148 MMHG | TEMPERATURE: 97.8 F | DIASTOLIC BLOOD PRESSURE: 64 MMHG

## 2022-06-02 VITALS — BODY MASS INDEX: 25 KG/M2

## 2022-06-02 DIAGNOSIS — I50.31: ICD-10-CM

## 2022-06-02 DIAGNOSIS — D46.9: Primary | ICD-10-CM

## 2022-06-02 DIAGNOSIS — I13.0: ICD-10-CM

## 2022-06-02 DIAGNOSIS — N18.4: ICD-10-CM

## 2022-06-02 DIAGNOSIS — D63.1: ICD-10-CM

## 2022-06-02 PROCEDURE — 96372 THER/PROPH/DIAG INJ SC/IM: CPT

## 2022-06-14 ENCOUNTER — HOSPITAL ENCOUNTER (EMERGENCY)
Age: 82
Discharge: HOME | End: 2022-06-14
Payer: MEDICARE

## 2022-06-14 VITALS
RESPIRATION RATE: 16 BRPM | HEART RATE: 81 BPM | OXYGEN SATURATION: 96 % | DIASTOLIC BLOOD PRESSURE: 71 MMHG | TEMPERATURE: 100.7 F | SYSTOLIC BLOOD PRESSURE: 128 MMHG

## 2022-06-14 VITALS
HEART RATE: 75 BPM | DIASTOLIC BLOOD PRESSURE: 64 MMHG | SYSTOLIC BLOOD PRESSURE: 138 MMHG | RESPIRATION RATE: 18 BRPM | OXYGEN SATURATION: 96 %

## 2022-06-14 VITALS — BODY MASS INDEX: 24.9 KG/M2 | BODY MASS INDEX: 25 KG/M2

## 2022-06-14 DIAGNOSIS — U07.1: Primary | ICD-10-CM

## 2022-06-14 PROCEDURE — 99281 EMR DPT VST MAYX REQ PHY/QHP: CPT

## 2022-06-22 ENCOUNTER — HOSPITAL ENCOUNTER (OUTPATIENT)
Age: 82
End: 2022-06-22
Payer: MEDICARE

## 2022-06-22 VITALS — BODY MASS INDEX: 25 KG/M2

## 2022-06-22 DIAGNOSIS — N18.4: Primary | ICD-10-CM

## 2022-06-22 LAB
25(OH)D3+25(OH)D2 SERPL-MCNC: 55.9 NG/ML (ref 30–100)
BUN SERPL-MCNC: 67 MG/DL (ref 9–20)
CALCIUM SERPL-MCNC: 8.2 MG/DL (ref 8.4–10.2)
CHLORIDE SERPL-SCNC: 104 MMOL/L (ref 98–107)
CO2 SERPL-SCNC: 28 MMOL/L (ref 22–32)
ESTIMATED GLOMERULAR FILT RATE: 16 ML/MIN (ref 60–?)
GLUCOSE SERPL-MCNC: 95 MG/DL (ref 80–110)
HEMOLYSIS: < 15 (ref 0–50)
MAGNESIUM SERPL-MCNC: 2.4 MG/DL (ref 1.6–2.3)
PHOSPHATE SERPL-MCNC: 4.3 MG/DL (ref 2.3–3.7)
POTASSIUM SERPL-SCNC: 4.3 MMOL/L (ref 3.4–5.1)
SODIUM SERPL-SCNC: 139 MMOL/L (ref 137–145)
URATE SERPL-MCNC: 8.4 MG/DL (ref 3.5–8.5)

## 2022-06-22 PROCEDURE — 84100 ASSAY OF PHOSPHORUS: CPT

## 2022-06-22 PROCEDURE — 83735 ASSAY OF MAGNESIUM: CPT

## 2022-06-22 PROCEDURE — 36415 COLL VENOUS BLD VENIPUNCTURE: CPT

## 2022-06-22 PROCEDURE — 82306 VITAMIN D 25 HYDROXY: CPT

## 2022-06-22 PROCEDURE — 83970 ASSAY OF PARATHORMONE: CPT

## 2022-06-22 PROCEDURE — 84550 ASSAY OF BLOOD/URIC ACID: CPT

## 2022-06-22 PROCEDURE — 80048 BASIC METABOLIC PNL TOTAL CA: CPT

## 2022-06-30 ENCOUNTER — HOSPITAL ENCOUNTER (OUTPATIENT)
Age: 82
End: 2022-06-30
Payer: MEDICARE

## 2022-06-30 VITALS — BODY MASS INDEX: 25 KG/M2

## 2022-06-30 DIAGNOSIS — D46.9: Primary | ICD-10-CM

## 2022-06-30 DIAGNOSIS — D63.1: ICD-10-CM

## 2022-06-30 DIAGNOSIS — I13.0: ICD-10-CM

## 2022-06-30 DIAGNOSIS — I50.31: ICD-10-CM

## 2022-06-30 DIAGNOSIS — N18.32: ICD-10-CM

## 2022-06-30 PROCEDURE — 99214 OFFICE O/P EST MOD 30 MIN: CPT

## 2022-06-30 PROCEDURE — 96372 THER/PROPH/DIAG INJ SC/IM: CPT

## 2022-07-07 ENCOUNTER — HOSPITAL ENCOUNTER (OUTPATIENT)
Age: 82
End: 2022-07-07
Payer: MEDICARE

## 2022-07-07 VITALS — BODY MASS INDEX: 25 KG/M2

## 2022-07-07 DIAGNOSIS — N17.9: Primary | ICD-10-CM

## 2022-07-07 LAB
ADD MANUAL DIFF / SLIDE REVIEW: NO
BUN SERPL-MCNC: 73 MG/DL (ref 9–20)
CALCIUM SERPL-MCNC: 8.2 MG/DL (ref 8.4–10.2)
CHLORIDE SERPL-SCNC: 107 MMOL/L (ref 98–107)
CO2 SERPL-SCNC: 28 MMOL/L (ref 22–32)
ESTIMATED GLOMERULAR FILT RATE: 20 ML/MIN (ref 60–?)
GLUCOSE SERPL-MCNC: 152 MG/DL (ref 80–110)
HEMATOCRIT: 20.6 % (ref 41–53)
HEMOGLOBIN: 6.8 G/DL (ref 13.5–17.5)
HEMOLYSIS: 22 (ref 0–50)
LYMPHOCYTES # SPEC AUTO: 500 /UL (ref 1100–4500)
MCV RBC: 102.3 FL (ref 80–100)
MEAN CORPUSCULAR HEMOGLOBIN: 33.9 PG (ref 26–34)
MEAN CORPUSCULAR HGB CONC: 33.1 % (ref 30–36)
PLATELET COUNT: 68 X10^3/UL (ref 150–400)
POTASSIUM SERPL-SCNC: 5.1 MMOL/L (ref 3.4–5.1)
SODIUM SERPL-SCNC: 139 MMOL/L (ref 137–145)

## 2022-07-07 PROCEDURE — 36415 COLL VENOUS BLD VENIPUNCTURE: CPT

## 2022-07-07 PROCEDURE — 85025 COMPLETE CBC W/AUTO DIFF WBC: CPT

## 2022-07-07 PROCEDURE — 80048 BASIC METABOLIC PNL TOTAL CA: CPT

## 2022-07-14 ENCOUNTER — HOSPITAL ENCOUNTER (OUTPATIENT)
Age: 82
End: 2022-07-14
Payer: MEDICARE

## 2022-07-14 VITALS
DIASTOLIC BLOOD PRESSURE: 60 MMHG | SYSTOLIC BLOOD PRESSURE: 129 MMHG | HEART RATE: 65 BPM | TEMPERATURE: 97.9 F | RESPIRATION RATE: 16 BRPM | OXYGEN SATURATION: 100 %

## 2022-07-14 VITALS — BODY MASS INDEX: 25 KG/M2

## 2022-07-14 DIAGNOSIS — D46.9: Primary | ICD-10-CM

## 2022-07-14 DIAGNOSIS — N18.32: ICD-10-CM

## 2022-07-14 DIAGNOSIS — I50.31: ICD-10-CM

## 2022-07-14 DIAGNOSIS — D63.1: ICD-10-CM

## 2022-07-14 DIAGNOSIS — I13.0: ICD-10-CM

## 2022-07-14 PROCEDURE — 96372 THER/PROPH/DIAG INJ SC/IM: CPT

## 2022-07-28 ENCOUNTER — HOSPITAL ENCOUNTER (OUTPATIENT)
Age: 82
End: 2022-07-28
Payer: MEDICARE

## 2022-07-28 VITALS
RESPIRATION RATE: 16 BRPM | DIASTOLIC BLOOD PRESSURE: 75 MMHG | HEART RATE: 57 BPM | SYSTOLIC BLOOD PRESSURE: 152 MMHG | TEMPERATURE: 98.06 F | OXYGEN SATURATION: 99 %

## 2022-07-28 VITALS — BODY MASS INDEX: 25 KG/M2

## 2022-07-28 DIAGNOSIS — D63.1: ICD-10-CM

## 2022-07-28 DIAGNOSIS — I50.31: ICD-10-CM

## 2022-07-28 DIAGNOSIS — N18.32: ICD-10-CM

## 2022-07-28 DIAGNOSIS — I13.0: ICD-10-CM

## 2022-07-28 DIAGNOSIS — D46.9: Primary | ICD-10-CM

## 2022-07-28 PROCEDURE — 96372 THER/PROPH/DIAG INJ SC/IM: CPT

## 2022-07-28 PROCEDURE — 36415 COLL VENOUS BLD VENIPUNCTURE: CPT

## 2022-07-28 PROCEDURE — 83550 IRON BINDING TEST: CPT

## 2022-07-28 PROCEDURE — 82728 ASSAY OF FERRITIN: CPT

## 2022-07-28 PROCEDURE — 83540 ASSAY OF IRON: CPT

## 2022-07-28 PROCEDURE — 85025 COMPLETE CBC W/AUTO DIFF WBC: CPT

## 2022-07-28 PROCEDURE — 80053 COMPREHEN METABOLIC PANEL: CPT

## 2022-08-02 ENCOUNTER — HOSPITAL ENCOUNTER (OUTPATIENT)
Age: 82
End: 2022-08-02
Payer: MEDICARE

## 2022-08-02 VITALS — BODY MASS INDEX: 25 KG/M2

## 2022-08-02 DIAGNOSIS — N17.9: Primary | ICD-10-CM

## 2022-08-02 LAB
ADD MANUAL DIFF / SLIDE REVIEW: NO
BUN SERPL-MCNC: 67 MG/DL (ref 9–20)
CALCIUM SERPL-MCNC: 8 MG/DL (ref 8.4–10.2)
CHLORIDE SERPL-SCNC: 104 MMOL/L (ref 98–107)
CO2 SERPL-SCNC: 29 MMOL/L (ref 22–32)
ESTIMATED GLOMERULAR FILT RATE: 21 ML/MIN (ref 60–?)
GLUCOSE SERPL-MCNC: 79 MG/DL (ref 80–110)
HEMATOCRIT: 23.7 % (ref 41–53)
HEMOGLOBIN: 7.7 G/DL (ref 13.5–17.5)
HEMOLYSIS: < 15 (ref 0–50)
LYMPHOCYTES # SPEC AUTO: 600 /UL (ref 1100–4500)
MCV RBC: 101.7 FL (ref 80–100)
MEAN CORPUSCULAR HEMOGLOBIN: 33.1 PG (ref 26–34)
MEAN CORPUSCULAR HGB CONC: 32.6 % (ref 30–36)
PLATELET COUNT: 70 X10^3/UL (ref 150–400)
POTASSIUM SERPL-SCNC: 4.6 MMOL/L (ref 3.4–5.1)
SODIUM SERPL-SCNC: 139 MMOL/L (ref 137–145)

## 2022-08-02 PROCEDURE — 80048 BASIC METABOLIC PNL TOTAL CA: CPT

## 2022-08-02 PROCEDURE — 36415 COLL VENOUS BLD VENIPUNCTURE: CPT

## 2022-08-02 PROCEDURE — 85025 COMPLETE CBC W/AUTO DIFF WBC: CPT

## 2022-08-08 ENCOUNTER — HOSPITAL ENCOUNTER (OUTPATIENT)
Age: 82
End: 2022-08-08
Payer: MEDICARE

## 2022-08-08 VITALS — BODY MASS INDEX: 25 KG/M2

## 2022-08-08 DIAGNOSIS — M19.011: ICD-10-CM

## 2022-08-08 DIAGNOSIS — M25.411: ICD-10-CM

## 2022-08-08 DIAGNOSIS — M75.121: Primary | ICD-10-CM

## 2022-08-08 PROCEDURE — 73221 MRI JOINT UPR EXTREM W/O DYE: CPT

## 2022-08-11 ENCOUNTER — HOSPITAL ENCOUNTER (OUTPATIENT)
Age: 82
End: 2022-08-11
Payer: MEDICARE

## 2022-08-11 VITALS
DIASTOLIC BLOOD PRESSURE: 61 MMHG | SYSTOLIC BLOOD PRESSURE: 143 MMHG | RESPIRATION RATE: 16 BRPM | HEART RATE: 53 BPM | OXYGEN SATURATION: 100 % | TEMPERATURE: 98.2 F

## 2022-08-11 VITALS — BODY MASS INDEX: 25 KG/M2

## 2022-08-11 DIAGNOSIS — I50.31: ICD-10-CM

## 2022-08-11 DIAGNOSIS — D46.9: Primary | ICD-10-CM

## 2022-08-11 DIAGNOSIS — I13.0: ICD-10-CM

## 2022-08-11 DIAGNOSIS — N18.32: ICD-10-CM

## 2022-08-11 DIAGNOSIS — D63.1: ICD-10-CM

## 2022-08-11 PROCEDURE — 96372 THER/PROPH/DIAG INJ SC/IM: CPT

## 2022-08-25 ENCOUNTER — HOSPITAL ENCOUNTER (OUTPATIENT)
Age: 82
End: 2022-08-25
Payer: MEDICARE

## 2022-08-25 VITALS
OXYGEN SATURATION: 100 % | DIASTOLIC BLOOD PRESSURE: 63 MMHG | SYSTOLIC BLOOD PRESSURE: 149 MMHG | TEMPERATURE: 96.26 F | RESPIRATION RATE: 16 BRPM | HEART RATE: 52 BPM

## 2022-08-25 VITALS — BODY MASS INDEX: 25 KG/M2

## 2022-08-25 DIAGNOSIS — N18.32: ICD-10-CM

## 2022-08-25 DIAGNOSIS — D63.1: ICD-10-CM

## 2022-08-25 DIAGNOSIS — D46.9: Primary | ICD-10-CM

## 2022-08-25 DIAGNOSIS — I50.31: ICD-10-CM

## 2022-08-25 DIAGNOSIS — I13.0: ICD-10-CM

## 2022-08-25 PROCEDURE — 96372 THER/PROPH/DIAG INJ SC/IM: CPT

## 2022-09-08 ENCOUNTER — HOSPITAL ENCOUNTER (OUTPATIENT)
Age: 82
End: 2022-09-08
Payer: MEDICARE

## 2022-09-08 VITALS
DIASTOLIC BLOOD PRESSURE: 63 MMHG | SYSTOLIC BLOOD PRESSURE: 132 MMHG | OXYGEN SATURATION: 99 % | HEART RATE: 54 BPM | RESPIRATION RATE: 18 BRPM | TEMPERATURE: 97.4 F

## 2022-09-08 VITALS — BODY MASS INDEX: 25 KG/M2

## 2022-09-08 DIAGNOSIS — D46.9: Primary | ICD-10-CM

## 2022-09-08 PROCEDURE — 96372 THER/PROPH/DIAG INJ SC/IM: CPT

## 2022-10-06 ENCOUNTER — HOSPITAL ENCOUNTER (OUTPATIENT)
Age: 82
End: 2022-10-06
Payer: MEDICARE

## 2022-10-06 VITALS — BODY MASS INDEX: 25 KG/M2

## 2022-10-06 VITALS
TEMPERATURE: 98.06 F | HEART RATE: 55 BPM | OXYGEN SATURATION: 99 % | SYSTOLIC BLOOD PRESSURE: 133 MMHG | RESPIRATION RATE: 16 BRPM | DIASTOLIC BLOOD PRESSURE: 53 MMHG

## 2022-10-06 DIAGNOSIS — D46.9: Primary | ICD-10-CM

## 2022-10-06 DIAGNOSIS — N18.4: ICD-10-CM

## 2022-10-06 DIAGNOSIS — I13.0: ICD-10-CM

## 2022-10-06 DIAGNOSIS — I50.31: ICD-10-CM

## 2022-10-06 DIAGNOSIS — D63.1: ICD-10-CM

## 2022-10-06 PROCEDURE — 96372 THER/PROPH/DIAG INJ SC/IM: CPT

## 2022-10-20 ENCOUNTER — HOSPITAL ENCOUNTER (OUTPATIENT)
Age: 82
End: 2022-10-20
Payer: MEDICARE

## 2022-10-20 VITALS
TEMPERATURE: 97.6 F | DIASTOLIC BLOOD PRESSURE: 60 MMHG | SYSTOLIC BLOOD PRESSURE: 133 MMHG | OXYGEN SATURATION: 99 % | RESPIRATION RATE: 16 BRPM | HEART RATE: 57 BPM

## 2022-10-20 VITALS — BODY MASS INDEX: 25 KG/M2

## 2022-10-20 DIAGNOSIS — I50.31: ICD-10-CM

## 2022-10-20 DIAGNOSIS — D63.1: ICD-10-CM

## 2022-10-20 DIAGNOSIS — D46.9: Primary | ICD-10-CM

## 2022-10-20 DIAGNOSIS — N18.4: ICD-10-CM

## 2022-10-20 DIAGNOSIS — I13.0: ICD-10-CM

## 2022-10-20 PROCEDURE — 96372 THER/PROPH/DIAG INJ SC/IM: CPT

## 2022-11-03 ENCOUNTER — HOSPITAL ENCOUNTER (OUTPATIENT)
Age: 82
End: 2022-11-03
Payer: MEDICARE

## 2022-11-03 VITALS
DIASTOLIC BLOOD PRESSURE: 64 MMHG | OXYGEN SATURATION: 100 % | SYSTOLIC BLOOD PRESSURE: 138 MMHG | HEART RATE: 57 BPM | TEMPERATURE: 97.52 F | RESPIRATION RATE: 16 BRPM

## 2022-11-03 VITALS — BODY MASS INDEX: 25 KG/M2

## 2022-11-03 DIAGNOSIS — I13.0: ICD-10-CM

## 2022-11-03 DIAGNOSIS — I50.31: ICD-10-CM

## 2022-11-03 DIAGNOSIS — D46.9: Primary | ICD-10-CM

## 2022-11-03 DIAGNOSIS — N18.4: ICD-10-CM

## 2022-11-03 DIAGNOSIS — D63.1: ICD-10-CM

## 2022-11-03 PROCEDURE — 96372 THER/PROPH/DIAG INJ SC/IM: CPT

## 2022-11-08 ENCOUNTER — HOSPITAL ENCOUNTER (OUTPATIENT)
Age: 82
End: 2022-11-08
Payer: MEDICARE

## 2022-11-08 VITALS — BODY MASS INDEX: 25 KG/M2

## 2022-11-08 DIAGNOSIS — N18.4: Primary | ICD-10-CM

## 2022-11-08 LAB
25(OH)D3+25(OH)D2 SERPL-MCNC: 66.6 NG/ML (ref 30–100)
ADD MANUAL DIFF / SLIDE REVIEW: YES
BASOPHILS NFR SPEC MANUAL: 2 % (ref 0–1)
BUN SERPL-MCNC: 77 MG/DL (ref 9–20)
CALCIUM SERPL-MCNC: 8.6 MG/DL (ref 8.4–10.2)
CHLORIDE SERPL-SCNC: 101 MMOL/L (ref 98–107)
CO2 SERPL-SCNC: 26 MMOL/L (ref 22–32)
EOSINOPHILS PERCENT MANUAL: 2 % (ref 2–4)
ESTIMATED GLOMERULAR FILT RATE: 19 ML/MIN (ref 60–?)
GLUCOSE SERPL-MCNC: 71 MG/DL (ref 80–110)
HEMATOCRIT: 30.1 % (ref 41–53)
HEMOGLOBIN: 10.1 G/DL (ref 13.5–17.5)
HEMOLYSIS: < 15 (ref 0–50)
LYMPHOCYTES PERCENT MANUAL: 34 % (ref 25–45)
MAGNESIUM SERPL-MCNC: 2.5 MG/DL (ref 1.6–2.3)
MCV RBC: 100.2 FL (ref 80–100)
MEAN CORPUSCULAR HEMOGLOBIN: 33.6 PG (ref 26–34)
MEAN CORPUSCULAR HGB CONC: 33.5 % (ref 30–36)
MICROALBUMI CREATININ RATIO UR: 176.6 UG/MG CR (ref ?–30)
MONOCYTES PERCENT MANUAL: 17 % (ref 2–11)
NEUTROPHILS ABSOLUTE MANUAL: 1125 /UL (ref 3000–5900)
NEUTS SEG NFR BLD: 45 % (ref 38–70)
PHOSPHATE SERPL-MCNC: 5.2 MG/DL (ref 2.3–3.7)
PLATELET COUNT: 86 X10^3/UL (ref 150–400)
POTASSIUM SERPL-SCNC: 4.7 MMOL/L (ref 3.4–5.1)
SODIUM SERPL-SCNC: 137 MMOL/L (ref 137–145)
TOTAL CELLS COUNTED: 100
URATE SERPL-MCNC: 10.2 MG/DL (ref 3.5–8.5)

## 2022-11-08 PROCEDURE — 83970 ASSAY OF PARATHORMONE: CPT

## 2022-11-08 PROCEDURE — 82570 ASSAY OF URINE CREATININE: CPT

## 2022-11-08 PROCEDURE — 80048 BASIC METABOLIC PNL TOTAL CA: CPT

## 2022-11-08 PROCEDURE — 85025 COMPLETE CBC W/AUTO DIFF WBC: CPT

## 2022-11-08 PROCEDURE — 82306 VITAMIN D 25 HYDROXY: CPT

## 2022-11-08 PROCEDURE — 82043 UR ALBUMIN QUANTITATIVE: CPT

## 2022-11-08 PROCEDURE — 85007 BL SMEAR W/DIFF WBC COUNT: CPT

## 2022-11-08 PROCEDURE — 83735 ASSAY OF MAGNESIUM: CPT

## 2022-11-08 PROCEDURE — 84550 ASSAY OF BLOOD/URIC ACID: CPT

## 2022-11-08 PROCEDURE — 84100 ASSAY OF PHOSPHORUS: CPT

## 2022-11-08 PROCEDURE — 36415 COLL VENOUS BLD VENIPUNCTURE: CPT

## 2022-11-17 ENCOUNTER — HOSPITAL ENCOUNTER (OUTPATIENT)
Age: 82
End: 2022-11-17
Payer: MEDICARE

## 2022-11-17 VITALS
OXYGEN SATURATION: 100 % | RESPIRATION RATE: 16 BRPM | SYSTOLIC BLOOD PRESSURE: 137 MMHG | DIASTOLIC BLOOD PRESSURE: 65 MMHG | HEART RATE: 55 BPM | TEMPERATURE: 97.4 F

## 2022-11-17 VITALS — BODY MASS INDEX: 25 KG/M2

## 2022-11-17 DIAGNOSIS — D46.9: Primary | ICD-10-CM

## 2022-11-17 DIAGNOSIS — D63.1: ICD-10-CM

## 2022-11-17 DIAGNOSIS — I50.31: ICD-10-CM

## 2022-11-17 DIAGNOSIS — N18.4: ICD-10-CM

## 2022-11-17 DIAGNOSIS — I13.0: ICD-10-CM

## 2022-11-17 PROCEDURE — 96372 THER/PROPH/DIAG INJ SC/IM: CPT

## 2022-12-01 ENCOUNTER — HOSPITAL ENCOUNTER (OUTPATIENT)
Age: 82
End: 2022-12-01
Payer: MEDICARE

## 2022-12-01 VITALS
DIASTOLIC BLOOD PRESSURE: 71 MMHG | OXYGEN SATURATION: 99 % | HEART RATE: 59 BPM | RESPIRATION RATE: 16 BRPM | TEMPERATURE: 98.24 F | SYSTOLIC BLOOD PRESSURE: 147 MMHG

## 2022-12-01 VITALS — BODY MASS INDEX: 25 KG/M2

## 2022-12-01 DIAGNOSIS — D46.9: Primary | ICD-10-CM

## 2022-12-01 DIAGNOSIS — I50.31: ICD-10-CM

## 2022-12-01 DIAGNOSIS — I13.0: ICD-10-CM

## 2022-12-01 DIAGNOSIS — N18.4: ICD-10-CM

## 2022-12-01 DIAGNOSIS — D63.1: ICD-10-CM

## 2022-12-01 PROCEDURE — 96372 THER/PROPH/DIAG INJ SC/IM: CPT

## 2022-12-15 ENCOUNTER — HOSPITAL ENCOUNTER (OUTPATIENT)
Age: 82
End: 2022-12-15
Payer: MEDICARE

## 2022-12-15 VITALS
HEART RATE: 57 BPM | TEMPERATURE: 98.2 F | SYSTOLIC BLOOD PRESSURE: 124 MMHG | OXYGEN SATURATION: 99 % | DIASTOLIC BLOOD PRESSURE: 66 MMHG | RESPIRATION RATE: 16 BRPM

## 2022-12-15 VITALS — BODY MASS INDEX: 25 KG/M2

## 2022-12-15 DIAGNOSIS — I13.0: ICD-10-CM

## 2022-12-15 DIAGNOSIS — D63.1: ICD-10-CM

## 2022-12-15 DIAGNOSIS — D46.9: Primary | ICD-10-CM

## 2022-12-15 DIAGNOSIS — I50.31: ICD-10-CM

## 2022-12-15 DIAGNOSIS — N18.4: ICD-10-CM

## 2022-12-15 PROCEDURE — 96372 THER/PROPH/DIAG INJ SC/IM: CPT

## 2022-12-19 ENCOUNTER — HOSPITAL ENCOUNTER (OUTPATIENT)
Age: 82
End: 2022-12-19
Payer: MEDICARE

## 2022-12-19 VITALS — BODY MASS INDEX: 25 KG/M2

## 2022-12-19 DIAGNOSIS — Z20.822: ICD-10-CM

## 2022-12-19 DIAGNOSIS — R05.9: Primary | ICD-10-CM

## 2022-12-19 LAB
FLUAV RNA UPPER RESP QL NAA+PROBE: (no result)
FLUBV RNA UPPER RESP QL NAA+PROBE: (no result)

## 2022-12-19 PROCEDURE — 0241U: CPT

## 2023-01-12 ENCOUNTER — HOSPITAL ENCOUNTER (OUTPATIENT)
Age: 83
End: 2023-01-12
Payer: MEDICARE

## 2023-01-12 VITALS
OXYGEN SATURATION: 99 % | TEMPERATURE: 97.6 F | HEART RATE: 57 BPM | SYSTOLIC BLOOD PRESSURE: 124 MMHG | RESPIRATION RATE: 16 BRPM | DIASTOLIC BLOOD PRESSURE: 62 MMHG

## 2023-01-12 VITALS — BODY MASS INDEX: 25 KG/M2

## 2023-01-12 DIAGNOSIS — D46.9: Primary | ICD-10-CM

## 2023-01-12 DIAGNOSIS — I13.0: ICD-10-CM

## 2023-01-12 DIAGNOSIS — I50.31: ICD-10-CM

## 2023-01-12 DIAGNOSIS — D63.1: ICD-10-CM

## 2023-01-12 DIAGNOSIS — N18.4: ICD-10-CM

## 2023-01-12 PROCEDURE — 96372 THER/PROPH/DIAG INJ SC/IM: CPT

## 2023-01-25 ENCOUNTER — HOSPITAL ENCOUNTER (OUTPATIENT)
Age: 83
End: 2023-01-25
Payer: MEDICARE

## 2023-01-25 VITALS — BODY MASS INDEX: 25 KG/M2

## 2023-01-25 DIAGNOSIS — N18.4: Primary | ICD-10-CM

## 2023-01-25 LAB
25(OH)D3+25(OH)D2 SERPL-MCNC: 61.8 NG/ML (ref 30–100)
BUN SERPL-MCNC: 66 MG/DL (ref 9–20)
CALCIUM SERPL-MCNC: 8.1 MG/DL (ref 8.4–10.2)
CHLORIDE SERPL-SCNC: 104 MMOL/L (ref 98–107)
CO2 SERPL-SCNC: 27 MMOL/L (ref 22–32)
ESTIMATED GLOMERULAR FILT RATE: 18 ML/MIN (ref 60–?)
GLUCOSE SERPL-MCNC: 85 MG/DL (ref 80–110)
HEMOLYSIS: < 15 (ref 0–50)
MAGNESIUM SERPL-MCNC: 2.4 MG/DL (ref 1.6–2.3)
PHOSPHATE SERPL-MCNC: 4.8 MG/DL (ref 2.3–3.7)
POTASSIUM SERPL-SCNC: 4.6 MMOL/L (ref 3.4–5.1)
SODIUM SERPL-SCNC: 140 MMOL/L (ref 137–145)
URATE SERPL-MCNC: 8.3 MG/DL (ref 3.5–8.5)

## 2023-01-25 PROCEDURE — 83970 ASSAY OF PARATHORMONE: CPT

## 2023-01-25 PROCEDURE — 83735 ASSAY OF MAGNESIUM: CPT

## 2023-01-25 PROCEDURE — 82306 VITAMIN D 25 HYDROXY: CPT

## 2023-01-25 PROCEDURE — 84100 ASSAY OF PHOSPHORUS: CPT

## 2023-01-25 PROCEDURE — 36415 COLL VENOUS BLD VENIPUNCTURE: CPT

## 2023-01-25 PROCEDURE — 80048 BASIC METABOLIC PNL TOTAL CA: CPT

## 2023-01-25 PROCEDURE — 84550 ASSAY OF BLOOD/URIC ACID: CPT

## 2023-01-26 ENCOUNTER — HOSPITAL ENCOUNTER (OUTPATIENT)
Age: 83
End: 2023-01-26
Payer: MEDICARE

## 2023-01-26 VITALS — BODY MASS INDEX: 25 KG/M2

## 2023-01-26 DIAGNOSIS — R53.83: ICD-10-CM

## 2023-01-26 DIAGNOSIS — N18.4: ICD-10-CM

## 2023-01-26 DIAGNOSIS — D63.1: ICD-10-CM

## 2023-01-26 DIAGNOSIS — I13.0: ICD-10-CM

## 2023-01-26 DIAGNOSIS — E78.5: ICD-10-CM

## 2023-01-26 DIAGNOSIS — D46.9: Primary | ICD-10-CM

## 2023-01-26 DIAGNOSIS — I50.31: ICD-10-CM

## 2023-01-26 PROCEDURE — 99214 OFFICE O/P EST MOD 30 MIN: CPT

## 2023-01-26 PROCEDURE — 96372 THER/PROPH/DIAG INJ SC/IM: CPT

## 2023-02-02 ENCOUNTER — HOSPITAL ENCOUNTER (OUTPATIENT)
Age: 83
End: 2023-02-02
Payer: MEDICARE

## 2023-02-02 VITALS — BODY MASS INDEX: 25 KG/M2

## 2023-02-02 VITALS
RESPIRATION RATE: 18 BRPM | TEMPERATURE: 96.2 F | DIASTOLIC BLOOD PRESSURE: 66 MMHG | SYSTOLIC BLOOD PRESSURE: 118 MMHG | OXYGEN SATURATION: 99 % | HEART RATE: 67 BPM

## 2023-02-02 DIAGNOSIS — D63.1: ICD-10-CM

## 2023-02-02 DIAGNOSIS — N18.4: ICD-10-CM

## 2023-02-02 DIAGNOSIS — D46.9: Primary | ICD-10-CM

## 2023-02-02 DIAGNOSIS — I50.31: ICD-10-CM

## 2023-02-02 DIAGNOSIS — I13.0: ICD-10-CM

## 2023-02-02 PROCEDURE — 96372 THER/PROPH/DIAG INJ SC/IM: CPT

## 2023-02-09 ENCOUNTER — HOSPITAL ENCOUNTER (OUTPATIENT)
Age: 83
End: 2023-02-09
Payer: MEDICARE

## 2023-02-09 VITALS
RESPIRATION RATE: 16 BRPM | DIASTOLIC BLOOD PRESSURE: 65 MMHG | TEMPERATURE: 96.4 F | HEART RATE: 59 BPM | OXYGEN SATURATION: 100 % | SYSTOLIC BLOOD PRESSURE: 129 MMHG

## 2023-02-09 VITALS — BODY MASS INDEX: 25 KG/M2

## 2023-02-09 DIAGNOSIS — D46.9: Primary | ICD-10-CM

## 2023-02-09 DIAGNOSIS — I50.31: ICD-10-CM

## 2023-02-09 DIAGNOSIS — D63.1: ICD-10-CM

## 2023-02-09 DIAGNOSIS — N18.4: ICD-10-CM

## 2023-02-09 DIAGNOSIS — I13.0: ICD-10-CM

## 2023-02-09 PROCEDURE — 96372 THER/PROPH/DIAG INJ SC/IM: CPT

## 2023-02-16 ENCOUNTER — HOSPITAL ENCOUNTER (OUTPATIENT)
Age: 83
End: 2023-02-16
Payer: MEDICARE

## 2023-02-16 VITALS
HEART RATE: 59 BPM | SYSTOLIC BLOOD PRESSURE: 121 MMHG | TEMPERATURE: 97.3 F | RESPIRATION RATE: 16 BRPM | OXYGEN SATURATION: 100 % | DIASTOLIC BLOOD PRESSURE: 68 MMHG

## 2023-02-16 VITALS — BODY MASS INDEX: 25 KG/M2

## 2023-02-16 DIAGNOSIS — I50.31: ICD-10-CM

## 2023-02-16 DIAGNOSIS — I13.0: ICD-10-CM

## 2023-02-16 DIAGNOSIS — D46.9: Primary | ICD-10-CM

## 2023-02-16 DIAGNOSIS — D63.1: ICD-10-CM

## 2023-02-16 DIAGNOSIS — N18.4: ICD-10-CM

## 2023-02-16 PROCEDURE — 96372 THER/PROPH/DIAG INJ SC/IM: CPT

## 2023-02-23 ENCOUNTER — HOSPITAL ENCOUNTER (OUTPATIENT)
Age: 83
End: 2023-02-23
Payer: MEDICARE

## 2023-02-23 VITALS
SYSTOLIC BLOOD PRESSURE: 143 MMHG | TEMPERATURE: 97.16 F | DIASTOLIC BLOOD PRESSURE: 77 MMHG | HEART RATE: 64 BPM | RESPIRATION RATE: 16 BRPM | OXYGEN SATURATION: 98 %

## 2023-02-23 VITALS — BODY MASS INDEX: 25 KG/M2

## 2023-02-23 DIAGNOSIS — D46.9: Primary | ICD-10-CM

## 2023-02-23 DIAGNOSIS — I50.31: ICD-10-CM

## 2023-02-23 DIAGNOSIS — I13.0: ICD-10-CM

## 2023-02-23 DIAGNOSIS — N18.4: ICD-10-CM

## 2023-02-23 DIAGNOSIS — D63.1: ICD-10-CM

## 2023-02-23 PROCEDURE — 96372 THER/PROPH/DIAG INJ SC/IM: CPT

## 2023-03-02 ENCOUNTER — HOSPITAL ENCOUNTER (OUTPATIENT)
Age: 83
End: 2023-03-02
Payer: MEDICARE

## 2023-03-02 VITALS — BODY MASS INDEX: 25 KG/M2

## 2023-03-02 DIAGNOSIS — N18.4: ICD-10-CM

## 2023-03-02 DIAGNOSIS — D46.9: Primary | ICD-10-CM

## 2023-03-02 DIAGNOSIS — D63.1: ICD-10-CM

## 2023-03-02 DIAGNOSIS — I50.31: ICD-10-CM

## 2023-03-02 DIAGNOSIS — I13.0: ICD-10-CM

## 2023-03-02 PROCEDURE — 99214 OFFICE O/P EST MOD 30 MIN: CPT

## 2023-03-02 PROCEDURE — 96372 THER/PROPH/DIAG INJ SC/IM: CPT

## 2023-03-13 ENCOUNTER — HOSPITAL ENCOUNTER (OUTPATIENT)
Age: 83
End: 2023-03-13
Payer: MEDICARE

## 2023-03-13 VITALS — BODY MASS INDEX: 25 KG/M2

## 2023-03-13 DIAGNOSIS — K57.30: ICD-10-CM

## 2023-03-13 DIAGNOSIS — N18.4: ICD-10-CM

## 2023-03-13 DIAGNOSIS — J90: ICD-10-CM

## 2023-03-13 DIAGNOSIS — K86.89: ICD-10-CM

## 2023-03-13 DIAGNOSIS — R19.7: Primary | ICD-10-CM

## 2023-03-13 PROCEDURE — 74176 CT ABD & PELVIS W/O CONTRAST: CPT

## 2023-03-30 ENCOUNTER — HOSPITAL ENCOUNTER (OUTPATIENT)
Age: 83
End: 2023-03-30
Payer: MEDICARE

## 2023-03-30 VITALS
DIASTOLIC BLOOD PRESSURE: 67 MMHG | SYSTOLIC BLOOD PRESSURE: 119 MMHG | TEMPERATURE: 97.9 F | HEART RATE: 61 BPM | OXYGEN SATURATION: 99 % | RESPIRATION RATE: 18 BRPM

## 2023-03-30 VITALS — BODY MASS INDEX: 25 KG/M2

## 2023-03-30 DIAGNOSIS — N18.4: ICD-10-CM

## 2023-03-30 DIAGNOSIS — I50.31: ICD-10-CM

## 2023-03-30 DIAGNOSIS — I13.0: ICD-10-CM

## 2023-03-30 DIAGNOSIS — D46.9: Primary | ICD-10-CM

## 2023-03-30 DIAGNOSIS — D63.1: ICD-10-CM

## 2023-03-30 PROCEDURE — 96372 THER/PROPH/DIAG INJ SC/IM: CPT

## 2023-04-03 ENCOUNTER — HOSPITAL ENCOUNTER (OUTPATIENT)
Age: 83
End: 2023-04-03
Payer: MEDICARE

## 2023-04-03 VITALS — BODY MASS INDEX: 25 KG/M2

## 2023-04-03 DIAGNOSIS — N18.4: Primary | ICD-10-CM

## 2023-04-03 LAB
ALBUMIN SERPL-MCNC: 3.3 G/DL (ref 3.5–5)
BUN SERPL-MCNC: 77 MG/DL (ref 9–20)
CALCIUM SERPL-MCNC: 8.1 MG/DL (ref 8.4–10.2)
CHLORIDE SERPL-SCNC: 103 MMOL/L (ref 98–107)
CO2 SERPL-SCNC: 27 MMOL/L (ref 22–32)
ESTIMATED GLOMERULAR FILT RATE: 16 ML/MIN (ref 60–?)
GLUCOSE SERPL-MCNC: 91 MG/DL (ref 80–110)
HEMOLYSIS: < 15 (ref 0–50)
MICROALBUMI CREATININ RATIO UR: 122.5 UG/MG CR (ref ?–30)
PHOSPHATE SERPL-MCNC: 4.2 MG/DL (ref 2.3–3.7)
POTASSIUM SERPL-SCNC: 4.7 MMOL/L (ref 3.4–5.1)
SODIUM SERPL-SCNC: 136 MMOL/L (ref 137–145)

## 2023-04-03 PROCEDURE — 82570 ASSAY OF URINE CREATININE: CPT

## 2023-04-03 PROCEDURE — 84100 ASSAY OF PHOSPHORUS: CPT

## 2023-04-03 PROCEDURE — 80048 BASIC METABOLIC PNL TOTAL CA: CPT

## 2023-04-03 PROCEDURE — 36415 COLL VENOUS BLD VENIPUNCTURE: CPT

## 2023-04-03 PROCEDURE — 82040 ASSAY OF SERUM ALBUMIN: CPT

## 2023-04-03 PROCEDURE — 83970 ASSAY OF PARATHORMONE: CPT

## 2023-04-03 PROCEDURE — 82043 UR ALBUMIN QUANTITATIVE: CPT

## 2023-04-13 ENCOUNTER — HOSPITAL ENCOUNTER (OUTPATIENT)
Age: 83
End: 2023-04-13
Payer: MEDICARE

## 2023-04-13 VITALS
RESPIRATION RATE: 18 BRPM | DIASTOLIC BLOOD PRESSURE: 68 MMHG | TEMPERATURE: 96.44 F | SYSTOLIC BLOOD PRESSURE: 115 MMHG | OXYGEN SATURATION: 99 % | HEART RATE: 63 BPM

## 2023-04-13 VITALS — BODY MASS INDEX: 25 KG/M2

## 2023-04-13 DIAGNOSIS — I13.0: ICD-10-CM

## 2023-04-13 DIAGNOSIS — N18.4: ICD-10-CM

## 2023-04-13 DIAGNOSIS — I50.31: ICD-10-CM

## 2023-04-13 DIAGNOSIS — D63.1: ICD-10-CM

## 2023-04-13 DIAGNOSIS — D46.9: Primary | ICD-10-CM

## 2023-04-13 PROCEDURE — 96372 THER/PROPH/DIAG INJ SC/IM: CPT

## 2023-04-20 ENCOUNTER — HOSPITAL ENCOUNTER (OUTPATIENT)
Age: 83
End: 2023-04-20
Payer: MEDICARE

## 2023-04-20 VITALS — BODY MASS INDEX: 25 KG/M2

## 2023-04-20 DIAGNOSIS — N18.4: Primary | ICD-10-CM

## 2023-04-20 LAB
ALBUMIN SERPL-MCNC: 3.5 G/DL (ref 3.5–5)
BUN SERPL-MCNC: 88 MG/DL (ref 9–20)
CALCIUM SERPL-MCNC: 8.3 MG/DL (ref 8.4–10.2)
CHLORIDE SERPL-SCNC: 105 MMOL/L (ref 98–107)
CO2 SERPL-SCNC: 27 MMOL/L (ref 22–32)
ESTIMATED GLOMERULAR FILT RATE: 15 ML/MIN (ref 60–?)
GLUCOSE SERPL-MCNC: 101 MG/DL (ref 80–110)
HEMOLYSIS: < 15 (ref 0–50)
POTASSIUM SERPL-SCNC: 4.8 MMOL/L (ref 3.4–5.1)
SODIUM SERPL-SCNC: 138 MMOL/L (ref 137–145)

## 2023-04-20 PROCEDURE — 82040 ASSAY OF SERUM ALBUMIN: CPT

## 2023-04-20 PROCEDURE — 80048 BASIC METABOLIC PNL TOTAL CA: CPT

## 2023-04-20 PROCEDURE — 36415 COLL VENOUS BLD VENIPUNCTURE: CPT

## 2023-04-25 ENCOUNTER — HOSPITAL ENCOUNTER (OUTPATIENT)
Age: 83
End: 2023-04-25
Payer: MEDICARE

## 2023-04-25 VITALS — BODY MASS INDEX: 25 KG/M2

## 2023-04-25 DIAGNOSIS — D46.9: Primary | ICD-10-CM

## 2023-04-25 DIAGNOSIS — N18.4: ICD-10-CM

## 2023-04-25 LAB
ADD MANUAL DIFF / SLIDE REVIEW: NO
ALBUMIN SERPL-MCNC: 3.7 G/DL (ref 3.5–5)
ALBUMIN/GLOB SERPL: 1.3 {RATIO} (ref 1–2.8)
ALP SERPL-CCNC: 60 U/L (ref 38–126)
ALT SERPL-CCNC: 26 IU/L (ref ?–50)
BUN SERPL-MCNC: 87 MG/DL (ref 9–20)
CALCIUM SERPL-MCNC: 8.3 MG/DL (ref 8.4–10.2)
CHLORIDE SERPL-SCNC: 104 MMOL/L (ref 98–107)
CO2 SERPL-SCNC: 26 MMOL/L (ref 22–32)
ESTIMATED GLOMERULAR FILT RATE: 16 ML/MIN (ref 60–?)
GLOBULIN SER CALC-MCNC: 2.8 G/DL (ref 1.7–4.1)
GLUCOSE SERPL-MCNC: 98 MG/DL (ref 80–110)
HEMATOCRIT: 26.7 % (ref 41–53)
HEMOGLOBIN: 8.8 G/DL (ref 13.5–17.5)
HEMOLYSIS: < 15 (ref 0–50)
LYMPHOCYTES # SPEC AUTO: 700 /UL (ref 1100–4500)
MAGNESIUM SERPL-MCNC: 2.5 MG/DL (ref 1.6–2.3)
MCV RBC: 102 FL (ref 80–100)
MEAN CORPUSCULAR HEMOGLOBIN: 33.5 PG (ref 26–34)
MEAN CORPUSCULAR HGB CONC: 32.8 % (ref 30–36)
PLATELET COUNT: 67 X10^3/UL (ref 150–400)
POTASSIUM SERPL-SCNC: 4.4 MMOL/L (ref 3.4–5.1)
PROT SERPL-MCNC: 6.5 G/DL (ref 6.3–8.2)
SODIUM SERPL-SCNC: 139 MMOL/L (ref 137–145)

## 2023-04-25 PROCEDURE — 36415 COLL VENOUS BLD VENIPUNCTURE: CPT

## 2023-04-25 PROCEDURE — 80053 COMPREHEN METABOLIC PANEL: CPT

## 2023-04-25 PROCEDURE — 83735 ASSAY OF MAGNESIUM: CPT

## 2023-04-25 PROCEDURE — 85025 COMPLETE CBC W/AUTO DIFF WBC: CPT

## 2023-04-27 ENCOUNTER — HOSPITAL ENCOUNTER (OUTPATIENT)
Age: 83
End: 2023-04-27
Payer: MEDICARE

## 2023-04-27 VITALS — BODY MASS INDEX: 25 KG/M2

## 2023-04-27 DIAGNOSIS — I50.31: ICD-10-CM

## 2023-04-27 DIAGNOSIS — N18.4: ICD-10-CM

## 2023-04-27 DIAGNOSIS — D46.9: Primary | ICD-10-CM

## 2023-04-27 DIAGNOSIS — I13.0: ICD-10-CM

## 2023-04-27 DIAGNOSIS — D63.1: ICD-10-CM

## 2023-04-27 PROCEDURE — 99214 OFFICE O/P EST MOD 30 MIN: CPT

## 2023-04-27 PROCEDURE — 96372 THER/PROPH/DIAG INJ SC/IM: CPT

## 2023-05-04 ENCOUNTER — HOSPITAL ENCOUNTER (OUTPATIENT)
Age: 83
End: 2023-05-04
Payer: MEDICARE

## 2023-05-04 VITALS
SYSTOLIC BLOOD PRESSURE: 125 MMHG | OXYGEN SATURATION: 100 % | TEMPERATURE: 96.6 F | HEART RATE: 61 BPM | RESPIRATION RATE: 18 BRPM | DIASTOLIC BLOOD PRESSURE: 70 MMHG

## 2023-05-04 DIAGNOSIS — D46.9: Primary | ICD-10-CM

## 2023-05-04 DIAGNOSIS — D63.1: ICD-10-CM

## 2023-05-04 DIAGNOSIS — I13.0: ICD-10-CM

## 2023-05-04 DIAGNOSIS — I50.31: ICD-10-CM

## 2023-05-04 DIAGNOSIS — N18.4: ICD-10-CM

## 2023-05-04 PROCEDURE — 96372 THER/PROPH/DIAG INJ SC/IM: CPT

## 2023-05-09 ENCOUNTER — HOSPITAL ENCOUNTER (OUTPATIENT)
Age: 83
End: 2023-05-09
Payer: MEDICARE

## 2023-05-09 DIAGNOSIS — N18.32: Primary | ICD-10-CM

## 2023-05-09 LAB
25(OH)D3+25(OH)D2 SERPL-MCNC: 66.3 NG/ML (ref 30–100)
ALBUMIN SERPL-MCNC: 3.3 G/DL (ref 3.5–5)
BUN SERPL-MCNC: 77 MG/DL (ref 9–20)
CALCIUM SERPL-MCNC: 8.5 MG/DL (ref 8.4–10.2)
CHLORIDE SERPL-SCNC: 102 MMOL/L (ref 98–107)
CO2 SERPL-SCNC: 32 MMOL/L (ref 22–32)
ESTIMATED GLOMERULAR FILT RATE: 17 ML/MIN (ref 60–?)
GLUCOSE SERPL-MCNC: 117 MG/DL (ref 80–110)
HEMOLYSIS: < 15 (ref 0–50)
MAGNESIUM SERPL-MCNC: 2.5 MG/DL (ref 1.6–2.3)
PHOSPHATE SERPL-MCNC: 4.5 MG/DL (ref 2.3–3.7)
POTASSIUM SERPL-SCNC: 4.8 MMOL/L (ref 3.4–5.1)
SODIUM SERPL-SCNC: 141 MMOL/L (ref 137–145)

## 2023-05-09 PROCEDURE — 80048 BASIC METABOLIC PNL TOTAL CA: CPT

## 2023-05-09 PROCEDURE — 36415 COLL VENOUS BLD VENIPUNCTURE: CPT

## 2023-05-09 PROCEDURE — 82306 VITAMIN D 25 HYDROXY: CPT

## 2023-05-09 PROCEDURE — 84100 ASSAY OF PHOSPHORUS: CPT

## 2023-05-09 PROCEDURE — 83970 ASSAY OF PARATHORMONE: CPT

## 2023-05-09 PROCEDURE — 82040 ASSAY OF SERUM ALBUMIN: CPT

## 2023-05-09 PROCEDURE — 83735 ASSAY OF MAGNESIUM: CPT

## 2023-05-11 ENCOUNTER — HOSPITAL ENCOUNTER (OUTPATIENT)
Age: 83
End: 2023-05-11
Payer: MEDICARE

## 2023-05-11 VITALS
SYSTOLIC BLOOD PRESSURE: 126 MMHG | OXYGEN SATURATION: 99 % | TEMPERATURE: 97.34 F | DIASTOLIC BLOOD PRESSURE: 78 MMHG | HEART RATE: 64 BPM | RESPIRATION RATE: 16 BRPM

## 2023-05-11 DIAGNOSIS — I50.31: ICD-10-CM

## 2023-05-11 DIAGNOSIS — D46.9: Primary | ICD-10-CM

## 2023-05-11 DIAGNOSIS — I13.0: ICD-10-CM

## 2023-05-11 DIAGNOSIS — N18.4: ICD-10-CM

## 2023-05-11 DIAGNOSIS — D63.1: ICD-10-CM

## 2023-05-11 PROCEDURE — 96372 THER/PROPH/DIAG INJ SC/IM: CPT

## 2023-05-18 ENCOUNTER — HOSPITAL ENCOUNTER (OUTPATIENT)
Age: 83
End: 2023-05-18
Payer: MEDICARE

## 2023-05-18 VITALS
DIASTOLIC BLOOD PRESSURE: 74 MMHG | HEART RATE: 69 BPM | TEMPERATURE: 97.3 F | RESPIRATION RATE: 16 BRPM | SYSTOLIC BLOOD PRESSURE: 123 MMHG | OXYGEN SATURATION: 99 %

## 2023-05-18 DIAGNOSIS — D46.9: Primary | ICD-10-CM

## 2023-05-18 PROCEDURE — 96372 THER/PROPH/DIAG INJ SC/IM: CPT

## 2023-05-25 ENCOUNTER — HOSPITAL ENCOUNTER (OUTPATIENT)
Age: 83
End: 2023-05-25
Payer: MEDICARE

## 2023-05-25 VITALS
OXYGEN SATURATION: 98 % | RESPIRATION RATE: 18 BRPM | HEART RATE: 66 BPM | SYSTOLIC BLOOD PRESSURE: 123 MMHG | TEMPERATURE: 97.88 F | DIASTOLIC BLOOD PRESSURE: 70 MMHG

## 2023-05-25 DIAGNOSIS — D46.9: Primary | ICD-10-CM

## 2023-05-25 PROCEDURE — 96372 THER/PROPH/DIAG INJ SC/IM: CPT

## 2023-06-01 ENCOUNTER — HOSPITAL ENCOUNTER (OUTPATIENT)
Age: 83
End: 2023-06-01
Payer: MEDICARE

## 2023-06-01 VITALS
DIASTOLIC BLOOD PRESSURE: 66 MMHG | SYSTOLIC BLOOD PRESSURE: 116 MMHG | OXYGEN SATURATION: 100 % | HEART RATE: 64 BPM | TEMPERATURE: 97.8 F | RESPIRATION RATE: 18 BRPM

## 2023-06-01 VITALS — BODY MASS INDEX: 25 KG/M2

## 2023-06-01 DIAGNOSIS — N18.4: Primary | ICD-10-CM

## 2023-06-01 DIAGNOSIS — D46.9: Primary | ICD-10-CM

## 2023-06-01 LAB
25(OH)D3+25(OH)D2 SERPL-MCNC: 64.4 NG/ML (ref 30–100)
ALBUMIN SERPL-MCNC: 3.3 G/DL (ref 3.5–5)
BUN SERPL-MCNC: 75 MG/DL (ref 9–20)
CALCIUM SERPL-MCNC: 8.1 MG/DL (ref 8.4–10.2)
CHLORIDE SERPL-SCNC: 102 MMOL/L (ref 98–107)
CO2 SERPL-SCNC: 29 MMOL/L (ref 22–32)
ESTIMATED GLOMERULAR FILT RATE: 18 ML/MIN (ref 60–?)
GLUCOSE SERPL-MCNC: 110 MG/DL (ref 80–110)
HEMOLYSIS: < 15 (ref 0–50)
MAGNESIUM SERPL-MCNC: 2.5 MG/DL (ref 1.6–2.3)
MICROALBUMI CREATININ RATIO UR: 315.4 UG/MG CR (ref ?–30)
PHOSPHATE SERPL-MCNC: 4.9 MG/DL (ref 2.3–3.7)
POTASSIUM SERPL-SCNC: 4.3 MMOL/L (ref 3.4–5.1)
SODIUM SERPL-SCNC: 137 MMOL/L (ref 137–145)

## 2023-06-01 PROCEDURE — 82040 ASSAY OF SERUM ALBUMIN: CPT

## 2023-06-01 PROCEDURE — 36415 COLL VENOUS BLD VENIPUNCTURE: CPT

## 2023-06-01 PROCEDURE — 80048 BASIC METABOLIC PNL TOTAL CA: CPT

## 2023-06-01 PROCEDURE — 84100 ASSAY OF PHOSPHORUS: CPT

## 2023-06-01 PROCEDURE — 82306 VITAMIN D 25 HYDROXY: CPT

## 2023-06-01 PROCEDURE — 82570 ASSAY OF URINE CREATININE: CPT

## 2023-06-01 PROCEDURE — 82043 UR ALBUMIN QUANTITATIVE: CPT

## 2023-06-01 PROCEDURE — 96372 THER/PROPH/DIAG INJ SC/IM: CPT

## 2023-06-01 PROCEDURE — 83735 ASSAY OF MAGNESIUM: CPT

## 2023-06-01 PROCEDURE — 83970 ASSAY OF PARATHORMONE: CPT

## 2023-06-08 ENCOUNTER — HOSPITAL ENCOUNTER (OUTPATIENT)
Age: 83
End: 2023-06-08
Payer: MEDICARE

## 2023-06-08 VITALS
SYSTOLIC BLOOD PRESSURE: 127 MMHG | RESPIRATION RATE: 16 BRPM | HEART RATE: 63 BPM | TEMPERATURE: 97.34 F | OXYGEN SATURATION: 97 % | DIASTOLIC BLOOD PRESSURE: 79 MMHG

## 2023-06-08 VITALS — BODY MASS INDEX: 25 KG/M2

## 2023-06-08 DIAGNOSIS — I50.31: ICD-10-CM

## 2023-06-08 DIAGNOSIS — N18.4: ICD-10-CM

## 2023-06-08 DIAGNOSIS — I13.0: ICD-10-CM

## 2023-06-08 DIAGNOSIS — D63.1: ICD-10-CM

## 2023-06-08 DIAGNOSIS — D46.9: Primary | ICD-10-CM

## 2023-06-08 PROCEDURE — 96372 THER/PROPH/DIAG INJ SC/IM: CPT

## 2023-06-15 ENCOUNTER — HOSPITAL ENCOUNTER (OUTPATIENT)
Age: 83
End: 2023-06-15
Payer: MEDICARE

## 2023-06-15 VITALS
RESPIRATION RATE: 16 BRPM | DIASTOLIC BLOOD PRESSURE: 67 MMHG | TEMPERATURE: 96.8 F | SYSTOLIC BLOOD PRESSURE: 109 MMHG | HEART RATE: 62 BPM | OXYGEN SATURATION: 100 %

## 2023-06-15 VITALS — BODY MASS INDEX: 25 KG/M2

## 2023-06-15 DIAGNOSIS — N18.4: ICD-10-CM

## 2023-06-15 DIAGNOSIS — D46.9: Primary | ICD-10-CM

## 2023-06-15 DIAGNOSIS — I50.31: ICD-10-CM

## 2023-06-15 DIAGNOSIS — I13.0: ICD-10-CM

## 2023-06-15 DIAGNOSIS — D63.1: ICD-10-CM

## 2023-06-15 PROCEDURE — 96372 THER/PROPH/DIAG INJ SC/IM: CPT

## 2023-06-22 ENCOUNTER — HOSPITAL ENCOUNTER (OUTPATIENT)
Age: 83
End: 2023-06-22
Payer: MEDICARE

## 2023-06-22 VITALS
TEMPERATURE: 97.8 F | SYSTOLIC BLOOD PRESSURE: 113 MMHG | OXYGEN SATURATION: 99 % | HEART RATE: 69 BPM | DIASTOLIC BLOOD PRESSURE: 67 MMHG | RESPIRATION RATE: 18 BRPM

## 2023-06-22 VITALS — BODY MASS INDEX: 25 KG/M2

## 2023-06-22 DIAGNOSIS — I13.0: ICD-10-CM

## 2023-06-22 DIAGNOSIS — I50.31: ICD-10-CM

## 2023-06-22 DIAGNOSIS — D46.9: Primary | ICD-10-CM

## 2023-06-22 DIAGNOSIS — N18.4: ICD-10-CM

## 2023-06-22 DIAGNOSIS — D63.1: ICD-10-CM

## 2023-06-22 PROCEDURE — 96372 THER/PROPH/DIAG INJ SC/IM: CPT

## 2023-06-28 ENCOUNTER — HOSPITAL ENCOUNTER (OUTPATIENT)
Age: 83
End: 2023-06-28
Payer: MEDICARE

## 2023-06-28 VITALS — BODY MASS INDEX: 25 KG/M2

## 2023-06-28 DIAGNOSIS — D46.9: ICD-10-CM

## 2023-06-28 DIAGNOSIS — N18.5: Primary | ICD-10-CM

## 2023-06-28 LAB
BUN SERPL-MCNC: 102 MG/DL (ref 9–20)
CALCIUM SERPL-MCNC: 8.4 MG/DL (ref 8.4–10.2)
CHLORIDE SERPL-SCNC: 101 MMOL/L (ref 98–107)
CO2 SERPL-SCNC: 31 MMOL/L (ref 22–32)
ESTIMATED GLOMERULAR FILT RATE: 18 ML/MIN (ref 60–?)
GLUCOSE SERPL-MCNC: 90 MG/DL (ref 80–110)
HEMATOCRIT: 28.2 % (ref 41–53)
HEMOGLOBIN: 9.2 G/DL (ref 13.5–17.5)
HEMOLYSIS: < 15 (ref 0–50)
MAGNESIUM SERPL-MCNC: 2.7 MG/DL (ref 1.6–2.3)
POTASSIUM SERPL-SCNC: 5 MMOL/L (ref 3.4–5.1)
SODIUM SERPL-SCNC: 140 MMOL/L (ref 137–145)

## 2023-06-28 PROCEDURE — 85018 HEMOGLOBIN: CPT

## 2023-06-28 PROCEDURE — 85014 HEMATOCRIT: CPT

## 2023-06-28 PROCEDURE — 83735 ASSAY OF MAGNESIUM: CPT

## 2023-06-28 PROCEDURE — 36415 COLL VENOUS BLD VENIPUNCTURE: CPT

## 2023-06-28 PROCEDURE — 80048 BASIC METABOLIC PNL TOTAL CA: CPT

## 2023-06-29 ENCOUNTER — HOSPITAL ENCOUNTER (OUTPATIENT)
Age: 83
End: 2023-06-29
Payer: MEDICARE

## 2023-06-29 VITALS
DIASTOLIC BLOOD PRESSURE: 71 MMHG | OXYGEN SATURATION: 99 % | SYSTOLIC BLOOD PRESSURE: 119 MMHG | HEART RATE: 65 BPM | RESPIRATION RATE: 16 BRPM | TEMPERATURE: 97.7 F

## 2023-06-29 VITALS — BODY MASS INDEX: 25 KG/M2

## 2023-06-29 DIAGNOSIS — I50.31: ICD-10-CM

## 2023-06-29 DIAGNOSIS — D46.9: Primary | ICD-10-CM

## 2023-06-29 DIAGNOSIS — D63.1: ICD-10-CM

## 2023-06-29 DIAGNOSIS — N18.4: ICD-10-CM

## 2023-06-29 DIAGNOSIS — I13.0: ICD-10-CM

## 2023-06-29 PROCEDURE — 96372 THER/PROPH/DIAG INJ SC/IM: CPT

## 2023-07-06 ENCOUNTER — HOSPITAL ENCOUNTER (OUTPATIENT)
Age: 83
End: 2023-07-06
Payer: MEDICARE

## 2023-07-06 VITALS
RESPIRATION RATE: 16 BRPM | DIASTOLIC BLOOD PRESSURE: 60 MMHG | HEART RATE: 65 BPM | TEMPERATURE: 98.1 F | OXYGEN SATURATION: 99 % | SYSTOLIC BLOOD PRESSURE: 114 MMHG

## 2023-07-06 VITALS — BODY MASS INDEX: 25 KG/M2

## 2023-07-06 DIAGNOSIS — I50.31: ICD-10-CM

## 2023-07-06 DIAGNOSIS — N18.4: ICD-10-CM

## 2023-07-06 DIAGNOSIS — D46.9: Primary | ICD-10-CM

## 2023-07-06 DIAGNOSIS — D63.1: ICD-10-CM

## 2023-07-06 DIAGNOSIS — I13.0: ICD-10-CM

## 2023-07-06 PROCEDURE — 96372 THER/PROPH/DIAG INJ SC/IM: CPT

## 2023-07-12 ENCOUNTER — HOSPITAL ENCOUNTER (OUTPATIENT)
Age: 83
End: 2023-07-12
Payer: MEDICARE

## 2023-07-12 VITALS — BODY MASS INDEX: 25 KG/M2

## 2023-07-12 DIAGNOSIS — N18.5: Primary | ICD-10-CM

## 2023-07-12 LAB
BUN SERPL-MCNC: 108 MG/DL (ref 9–20)
CALCIUM SERPL-MCNC: 8.7 MG/DL (ref 8.4–10.2)
CHLORIDE SERPL-SCNC: 91 MMOL/L (ref 98–107)
CO2 SERPL-SCNC: 34 MMOL/L (ref 22–32)
ESTIMATED GLOMERULAR FILT RATE: 16 ML/MIN (ref 60–?)
GLUCOSE SERPL-MCNC: 116 MG/DL (ref 80–110)
HEMOLYSIS: < 15 (ref 0–50)
MAGNESIUM SERPL-MCNC: 2.8 MG/DL (ref 1.6–2.3)
POTASSIUM SERPL-SCNC: 4 MMOL/L (ref 3.4–5.1)
SODIUM SERPL-SCNC: 135 MMOL/L (ref 137–145)

## 2023-07-12 PROCEDURE — 83735 ASSAY OF MAGNESIUM: CPT

## 2023-07-12 PROCEDURE — 36415 COLL VENOUS BLD VENIPUNCTURE: CPT

## 2023-07-12 PROCEDURE — 80048 BASIC METABOLIC PNL TOTAL CA: CPT

## 2023-07-13 ENCOUNTER — HOSPITAL ENCOUNTER (OUTPATIENT)
Age: 83
End: 2023-07-13
Payer: MEDICARE

## 2023-07-13 VITALS — BODY MASS INDEX: 25 KG/M2

## 2023-07-13 VITALS
SYSTOLIC BLOOD PRESSURE: 107 MMHG | HEART RATE: 67 BPM | TEMPERATURE: 96.98 F | OXYGEN SATURATION: 100 % | DIASTOLIC BLOOD PRESSURE: 67 MMHG | RESPIRATION RATE: 18 BRPM

## 2023-07-13 DIAGNOSIS — D63.1: ICD-10-CM

## 2023-07-13 DIAGNOSIS — N18.4: ICD-10-CM

## 2023-07-13 DIAGNOSIS — I50.31: ICD-10-CM

## 2023-07-13 DIAGNOSIS — D46.9: Primary | ICD-10-CM

## 2023-07-13 DIAGNOSIS — I13.0: ICD-10-CM

## 2023-07-13 PROCEDURE — 96372 THER/PROPH/DIAG INJ SC/IM: CPT

## 2023-07-20 ENCOUNTER — HOSPITAL ENCOUNTER (OUTPATIENT)
Age: 83
End: 2023-07-20
Payer: MEDICARE

## 2023-07-20 VITALS
RESPIRATION RATE: 16 BRPM | OXYGEN SATURATION: 99 % | TEMPERATURE: 97.88 F | DIASTOLIC BLOOD PRESSURE: 77 MMHG | SYSTOLIC BLOOD PRESSURE: 120 MMHG | HEART RATE: 76 BPM

## 2023-07-20 VITALS — BODY MASS INDEX: 25 KG/M2

## 2023-07-20 DIAGNOSIS — D46.9: Primary | ICD-10-CM

## 2023-07-20 DIAGNOSIS — D63.1: ICD-10-CM

## 2023-07-20 DIAGNOSIS — N18.4: ICD-10-CM

## 2023-07-20 DIAGNOSIS — I50.31: ICD-10-CM

## 2023-07-20 DIAGNOSIS — I13.0: ICD-10-CM

## 2023-07-20 PROCEDURE — 96372 THER/PROPH/DIAG INJ SC/IM: CPT

## 2023-07-26 ENCOUNTER — HOSPITAL ENCOUNTER (OUTPATIENT)
Age: 83
End: 2023-07-26
Payer: MEDICARE

## 2023-07-26 VITALS — BODY MASS INDEX: 25 KG/M2

## 2023-07-26 DIAGNOSIS — N18.5: Primary | ICD-10-CM

## 2023-07-26 LAB
BUN SERPL-MCNC: 109 MG/DL (ref 9–20)
CALCIUM SERPL-MCNC: 8.3 MG/DL (ref 8.4–10.2)
CHLORIDE SERPL-SCNC: 98 MMOL/L (ref 98–107)
CO2 SERPL-SCNC: 29 MMOL/L (ref 22–32)
ESTIMATED GLOMERULAR FILT RATE: 17 ML/MIN (ref 60–?)
GLUCOSE SERPL-MCNC: 140 MG/DL (ref 80–110)
HEMOLYSIS: 33 (ref 0–50)
MAGNESIUM SERPL-MCNC: 2.8 MG/DL (ref 1.6–2.3)
POTASSIUM SERPL-SCNC: 4.3 MMOL/L (ref 3.4–5.1)
SODIUM SERPL-SCNC: 139 MMOL/L (ref 137–145)

## 2023-07-26 PROCEDURE — 36415 COLL VENOUS BLD VENIPUNCTURE: CPT

## 2023-07-26 PROCEDURE — 83735 ASSAY OF MAGNESIUM: CPT

## 2023-07-26 PROCEDURE — 80048 BASIC METABOLIC PNL TOTAL CA: CPT

## 2023-07-27 ENCOUNTER — HOSPITAL ENCOUNTER (OUTPATIENT)
Age: 83
End: 2023-07-27
Payer: MEDICARE

## 2023-07-27 VITALS — BODY MASS INDEX: 25 KG/M2

## 2023-07-27 DIAGNOSIS — I13.0: ICD-10-CM

## 2023-07-27 DIAGNOSIS — I50.31: ICD-10-CM

## 2023-07-27 DIAGNOSIS — N18.4: ICD-10-CM

## 2023-07-27 DIAGNOSIS — D63.1: ICD-10-CM

## 2023-07-27 DIAGNOSIS — D46.9: Primary | ICD-10-CM

## 2023-07-27 PROCEDURE — 99214 OFFICE O/P EST MOD 30 MIN: CPT

## 2023-07-27 PROCEDURE — 96372 THER/PROPH/DIAG INJ SC/IM: CPT
